# Patient Record
Sex: MALE | Race: WHITE | Employment: FULL TIME | URBAN - METROPOLITAN AREA
[De-identification: names, ages, dates, MRNs, and addresses within clinical notes are randomized per-mention and may not be internally consistent; named-entity substitution may affect disease eponyms.]

---

## 2019-08-05 ENCOUNTER — OFFICE VISIT (OUTPATIENT)
Dept: ORTHOPEDIC SURGERY | Age: 64
End: 2019-08-05
Payer: COMMERCIAL

## 2019-08-05 VITALS — DIASTOLIC BLOOD PRESSURE: 75 MMHG | SYSTOLIC BLOOD PRESSURE: 126 MMHG | HEART RATE: 52 BPM

## 2019-08-05 DIAGNOSIS — S46.112A RUPTURE LONG HEAD BICEPS TENDON, LEFT, INITIAL ENCOUNTER: Primary | ICD-10-CM

## 2019-08-05 DIAGNOSIS — M25.512 LEFT SHOULDER PAIN, UNSPECIFIED CHRONICITY: ICD-10-CM

## 2019-08-05 PROCEDURE — 99204 OFFICE O/P NEW MOD 45 MIN: CPT | Performed by: ORTHOPAEDIC SURGERY

## 2019-08-05 NOTE — PROGRESS NOTES
Date:  2019    Name:  Eric Maradiaga  Address:  [address] [place] [id number]    :  1955      Age:   59 y.o.    SSN:  [id number]      Medical Record Number:  [id number]    Reason for Visit:    Chief Complaint    Arm Pain (np left arm / shoulder. pt states that he was lifting a hot water heater and felt a pop in his shoulder )      DOS:2019     HPI: Eric Maradiaga is a 59 y.o. male here today for evaluation of left arm pain that has been ongoing for 2 and a half weeks. He injured his left bicep at work on 19 while moving a hot water heater. He states he felt a pop and noticed a knot in his bicep. He went to  some tools shortly after and felt a burning in his left arm. He complains of pain and weakness with lifting. He denies numbness and tingling. The pain will wake him up at night if he lays on his left shoulder. He has been treating his pain with ibuprofen. No past medical history on file. No past surgical history on file. No family history on file.     Social History     Socioeconomic History    Marital status:      Spouse name: Not on file    Number of children: Not on file    Years of education: Not on file    Highest education level: Not on file   Occupational History    Not on file   Social Needs    Financial resource strain: Not on file    Food insecurity:     Worry: Not on file     Inability: Not on file    Transportation needs:     Medical: Not on file     Non-medical: Not on file   Tobacco Use    Smoking status: Not on file   Substance and Sexual Activity    Alcohol use: Not on file    Drug use: Not on file    Sexual activity: Not on file   Lifestyle    Physical activity:     Days per week: Not on file     Minutes per session: Not on file    Stress: Not on file   Relationships    Social connections:     Talks on phone: Not on file     Gets together: Not on file     Attends Mu-ism service: Not on file     Active member of club or over the deltoid. Right Shoulder Examination:    Inspection:  Held in a normal posture. Normal contour at the acromioclavicular joint. No abnormal swelling, ecchymosis or erythema about the shoulder. No induration. No atrophy appreciated. Palpation: Tenderness of the greater tuberosity. Acromioclavicular joint: Nontender to palpation. Range of Motion: Normal scapulothoracic rhythm. Mild limitation of internal rotation. Strength: Normal infraspinatus and subscapularis muscle strength. Mild supraspinatus muscle weakness secondary to pain. Instability: No anterior or posterior subluxation/instability. Additional Tests: Crossover sign is negative. Belly press sign is negative. Lift off sign is negative. Labral findings are negative. Speed sign and Yergason signs are both negative. Positive impingement findings. Vascular:       Skin warm well perfused. Neurologic:     Sensation intact to light touch. Diagnostics:  Radiology:     Pertinent imaging reviewed. X-rays of the left shoulder including left Grashey, left scapular Y and left axillary views were obtained and reviewed in office:    Impression: No acute bony abnormalities appreciated on radiographic examination. Mild impingement. Assessment: Rupture long head of the biceps tendon, left    Plan: Pertinent imaging was reviewed. The etiology, natural history, and treatment options for the disorder were discussed. The roles of activity medication, antiinflammatories, injections, bracing, physical therapy, and surgical interventions were all described to the patient and questions were answered. I do not believe surgical intervention is needed. We discussed activity modification and to avoid heavy lifting while healing takes place. He can use ice and Tylenol as needed. I will see him back in one month, sooner if symptoms worsen. Karen Cordero is in agreement with this plan.  All questions were answered to patient's

## 2022-06-27 LAB
A/G RATIO: 1.5 (ref 1–2.1)
ALBUMIN SERPL-MCNC: 4.6 G/DL (ref 3.5–5)
ALP BLD-CCNC: 68 U/L (ref 33–140)
ALT SERPL-CCNC: 27 U/L (ref 0–60)
ANION GAP SERPL CALCULATED.3IONS-SCNC: 10 MMOL/L (ref 5–13)
AST SERPL-CCNC: 31 U/L (ref 0–40)
BILIRUB SERPL-MCNC: 0.4 MG/DL (ref 0.2–1.2)
BUN / CREAT RATIO: 15
BUN BLDV-MCNC: 16 MG/DL (ref 7–25)
CALCIUM SERPL-MCNC: 9.6 MG/DL (ref 8.8–10.9)
CHLORIDE BLD-SCNC: 105 MMOL/L (ref 98–110)
CHOLESTEROL, TOTAL: 208 MG/DL (ref 125–199)
CHOLESTEROL/HDL RATIO: 5.3 (ref 0–5)
CO2: 28 MMOL/L (ref 22–29)
CREAT SERPL-MCNC: 1.05 MG/DL (ref 0.5–1.3)
EGFR (CKD-EPI): 78 SEE NOTE
GLOBULIN: 3.1 G/DL (ref 2–3.7)
GLUCOSE BLD-MCNC: 88 MG/DL (ref 71–99)
HDLC SERPL-MCNC: 39 MG/DL (ref 40–180)
LDL CHOLESTEROL CALCULATED: ABNORMAL
POTASSIUM SERPL-SCNC: 5.5 MMOL/L (ref 3.5–5.1)
SODIUM BLD-SCNC: 143 MMOL/L (ref 135–146)
TOTAL PROTEIN: 7.7 G/DL (ref 6–8)
TRIGL SERPL-MCNC: 477 MG/DL (ref 0–149)

## 2022-06-29 LAB
ANION GAP SERPL CALCULATED.3IONS-SCNC: 11 MMOL/L (ref 5–13)
BUN / CREAT RATIO: 19
BUN BLDV-MCNC: 23 MG/DL (ref 7–25)
CALCIUM SERPL-MCNC: 9.1 MG/DL (ref 8.8–10.9)
CHLORIDE BLD-SCNC: 102 MMOL/L (ref 98–110)
CO2: 24 MMOL/L (ref 22–29)
CREAT SERPL-MCNC: 1.21 MG/DL (ref 0.5–1.3)
EGFR (CKD-EPI): 66 SEE NOTE
GLUCOSE BLD-MCNC: 175 MG/DL (ref 71–99)
POTASSIUM SERPL-SCNC: 4.3 MMOL/L (ref 3.5–5.1)
SODIUM BLD-SCNC: 137 MMOL/L (ref 135–146)

## 2022-06-30 ENCOUNTER — TELEPHONE (OUTPATIENT)
Dept: ORTHOPEDIC SURGERY | Age: 67
End: 2022-06-30

## 2022-06-30 ENCOUNTER — OFFICE VISIT (OUTPATIENT)
Dept: ORTHOPEDIC SURGERY | Age: 67
End: 2022-06-30

## 2022-06-30 VITALS — HEIGHT: 70 IN | WEIGHT: 178 LBS | BODY MASS INDEX: 25.48 KG/M2

## 2022-06-30 DIAGNOSIS — M25.511 RIGHT SHOULDER PAIN, UNSPECIFIED CHRONICITY: Primary | ICD-10-CM

## 2022-06-30 DIAGNOSIS — Z98.890 H/O REPAIR OF RIGHT ROTATOR CUFF: ICD-10-CM

## 2022-06-30 DIAGNOSIS — M67.911 ROTATOR CUFF DISORDER, RIGHT: ICD-10-CM

## 2022-06-30 PROCEDURE — 99214 OFFICE O/P EST MOD 30 MIN: CPT | Performed by: ORTHOPAEDIC SURGERY

## 2022-06-30 PROCEDURE — 4004F PT TOBACCO SCREEN RCVD TLK: CPT | Performed by: ORTHOPAEDIC SURGERY

## 2022-06-30 PROCEDURE — 3017F COLORECTAL CA SCREEN DOC REV: CPT | Performed by: ORTHOPAEDIC SURGERY

## 2022-06-30 PROCEDURE — G8427 DOCREV CUR MEDS BY ELIG CLIN: HCPCS | Performed by: ORTHOPAEDIC SURGERY

## 2022-06-30 PROCEDURE — G8417 CALC BMI ABV UP PARAM F/U: HCPCS | Performed by: ORTHOPAEDIC SURGERY

## 2022-06-30 PROCEDURE — 1123F ACP DISCUSS/DSCN MKR DOCD: CPT | Performed by: ORTHOPAEDIC SURGERY

## 2022-06-30 RX ORDER — CYCLOBENZAPRINE HCL 10 MG
10 TABLET ORAL 3 TIMES DAILY PRN
COMMUNITY
Start: 2022-06-27

## 2022-06-30 RX ORDER — MELOXICAM 15 MG/1
15 TABLET ORAL DAILY PRN
Qty: 30 TABLET | Refills: 0 | Status: ON HOLD | OUTPATIENT
Start: 2022-06-30 | End: 2022-08-12 | Stop reason: HOSPADM

## 2022-06-30 RX ORDER — PREDNISONE 10 MG/1
TABLET ORAL
COMMUNITY
Start: 2022-06-27 | End: 2022-07-06

## 2022-06-30 RX ORDER — ROSUVASTATIN CALCIUM 20 MG/1
20 TABLET, COATED ORAL DAILY
COMMUNITY
Start: 2022-06-27

## 2022-06-30 NOTE — PROGRESS NOTES
Chief Complaint    Shoulder Pain (NP RT shoulder)      History of Present Illness:  Jeremie Hensley is a pleasant,  79 y.o. male here today for evaluation of right shoulder. He previously underwent right shoulder arthroscopic repair about 9 years ago by Dr. Ada Carbajal. He was doing relatively well till about 5 days ago, he was cutting grass and landed on his right shoulder. After that his pain is getting worse. He describes his pain as dull aching in quality , about 5/10 in severity and waking him up from sleep. He denies popping, clicking or any other mechanical symptoms. He denies numbness, tingling or any other neurological symptoms. Medical History:    No notes on file    Patient's medications, allergies, past medical, surgical, social and family histories were reviewed and updated as appropriate. No past medical history on file. Social History     Socioeconomic History    Marital status:      Spouse name: Not on file    Number of children: Not on file    Years of education: Not on file    Highest education level: Not on file   Occupational History    Not on file   Tobacco Use    Smoking status: Not on file    Smokeless tobacco: Not on file   Substance and Sexual Activity    Alcohol use: Not on file    Drug use: Not on file    Sexual activity: Not on file   Other Topics Concern    Not on file   Social History Narrative    Not on file     Social Determinants of Health     Financial Resource Strain:     Difficulty of Paying Living Expenses: Not on file   Food Insecurity:     Worried About Running Out of Food in the Last Year: Not on file    Armin of Food in the Last Year: Not on file   Transportation Needs:     Lack of Transportation (Medical): Not on file    Lack of Transportation (Non-Medical):  Not on file   Physical Activity:     Days of Exercise per Week: Not on file    Minutes of Exercise per Session: Not on file   Stress:     Feeling of Stress : Not on file   Social Connections:  Frequency of Communication with Friends and Family: Not on file    Frequency of Social Gatherings with Friends and Family: Not on file    Attends Confucianist Services: Not on file    Active Member of Clubs or Organizations: Not on file    Attends Club or Organization Meetings: Not on file    Marital Status: Not on file   Intimate Partner Violence:     Fear of Current or Ex-Partner: Not on file    Emotionally Abused: Not on file    Physically Abused: Not on file    Sexually Abused: Not on file   Housing Stability:     Unable to Pay for Housing in the Last Year: Not on file    Number of [place] in the Last Year: Not on file    Unstable Housing in the Last Year: Not on file       No Known Allergies  Current Outpatient Medications on File Prior to Visit   Medication Sig Dispense Refill    cyclobenzaprine (FLEXERIL) 10 MG tablet Take 10 mg by mouth 3 times daily as needed      predniSONE (DELTASONE) 10 MG tablet Take by mouth      rosuvastatin (CRESTOR) 20 MG tablet Take 20 mg by mouth daily       No current facility-administered medications on file prior to visit. Review of Systems  A 14 point review of systems was completed by the patient on 6/30/2022 and is available in the media section of the scanned medical record and was reviewed on 6/30/2022. The review is negative with the exception of those things mentioned in the HPI and Past Medical History    Vital Signs: There were no vitals filed for this visit. General Exam:   Constitutional: Patient is adequately groomed with no evidence of malnutrition      Right Shoulder Examination:    Inspection:  No skin lesions, no deformity, no swelling. Palpation:  Pain over rotator cuff. Biceps not tender. Slight tenderness over the [facility] joint    Active Range of Motion:   Forward Elevation 150, Abduction 150, External Rotation 30, Internal Rotation T12    Passive Range of Motion: Deffered    Strength:  External Rotation 4/5, Internal Rotation 4/5, Champagne Toast 4/5, Supraspinatus 4/5    Special Tests:  positive Eloina's. No Tomi deformity. Negative belly press. Negative bear hug  Test     Neurovascular: Palpable radial pulse, normal sensation in the median, ulnar, radial nerve distributions        Comparison left  Shoulder Examination:    Inspection:  No skin lesions, no deformity, no swelling. Palpation:   No tenderness     Active Range of Motion: Forward Elevation 150, Abduction 150, External Rotation 40, Internal Rotation T6    Passive Range of Motion: Deffered    Strength:  5/5    Special Tests:  No Tomi Deformity    Neurovascular:  Palpable radial pulse, normal sensation in the median, ulnar, radial nerve distributions      Self assessment questionnaires were completed today. Radiology:     Plain radiographs of the right shoulder, comprising 3 views: AP, Scapular Y and Axillary lateral were  obtained and reviewed in the office:    Impression: no evidence of fracture or dislocation   There is slight migration of the humeral head superiorly   Mild glenohumeral osteoarthritis          Assessment :  Marcelino Nice is a pleasant 79 y.o. male with pain related to right shoulder rotator cuff retear after falling down 5 days ago. The next step is to order an MRI to evaluate for a rotator cuff re-tear. Impression:  Encounter Diagnoses   Name Primary?  Right shoulder pain, unspecified chronicity Yes    H/O repair of right rotator cuff     Rotator cuff disorder, right        Office Procedures:  Orders Placed This Encounter   Procedures    MRI SHOULDER RIGHT WO CONTRAST     Standing Status:   Future     Standing Expiration Date:   6/30/2023     Order Specific Question:   Reason for exam:     Answer:   Bryan Greene       Treatment Plan: We had a thorough discussion with Marcelino Nice regarding examination findings and next steps. He previously underwent right shoulder rotator cuff repair nearly 10 years ago.  He was doing well until a recent injury. Unfortunately has fell down few days ago on his right shoulder. After that he has experienced right shoulder pain with any overhead activities. His clinical examination shows weak rotator cuff muscles. Would like to have an MRI of his right shoulder to rule out rotator cuff retear. We would like to see him after the MRI. He was in agreement with this plan and all questions were answered to the patient's satisfaction. He was encouraged to call with any questions. 11:56 AM  6/30/2022    Stephen Maldonado MD  Clinical Fellow at [address] [place] [address]    During this examination, Brenda Cespedes MD functioned as a scribe for Dr. Syl Le. This dictation was performed with a verbal recognition program (DRAGON) and it was checked for errors. It is possible that there are still dictated errors within this office note. If so, please bring any errors to my attention for an addendum. All efforts were made to ensure that this office note is accurate.  ______________  I was physically present and personally supervised the Orthopaedic Sports Medicine Fellow in the evaluation and development of a treatment plan for this patient. I personally interviewed the patient and performed a physical examination. In addition, I discussed the patient's condition and treatment options with them. I have also reviewed and agree with the past medical, family and social history unless otherwise noted. All of the patient's questions were answered. Hemant Philip MD, PhD  6/30/2022

## 2022-07-05 ENCOUNTER — OFFICE VISIT (OUTPATIENT)
Dept: ORTHOPEDIC SURGERY | Age: 67
End: 2022-07-05
Payer: MEDICARE

## 2022-07-05 ENCOUNTER — TELEPHONE (OUTPATIENT)
Dept: ORTHOPEDIC SURGERY | Age: 67
End: 2022-07-05

## 2022-07-05 VITALS — BODY MASS INDEX: 25.48 KG/M2 | HEIGHT: 70 IN | WEIGHT: 178 LBS

## 2022-07-05 DIAGNOSIS — S46.011D TRAUMATIC COMPLETE TEAR OF RIGHT ROTATOR CUFF, SUBSEQUENT ENCOUNTER: ICD-10-CM

## 2022-07-05 DIAGNOSIS — Z98.890 H/O REPAIR OF RIGHT ROTATOR CUFF: Primary | ICD-10-CM

## 2022-07-05 DIAGNOSIS — M25.511 RIGHT SHOULDER PAIN, UNSPECIFIED CHRONICITY: ICD-10-CM

## 2022-07-05 PROBLEM — E78.00 HYPERCHOLESTEREMIA: Status: ACTIVE | Noted: 2020-12-22

## 2022-07-05 PROCEDURE — G8417 CALC BMI ABV UP PARAM F/U: HCPCS | Performed by: ORTHOPAEDIC SURGERY

## 2022-07-05 PROCEDURE — 3017F COLORECTAL CA SCREEN DOC REV: CPT | Performed by: ORTHOPAEDIC SURGERY

## 2022-07-05 PROCEDURE — 4004F PT TOBACCO SCREEN RCVD TLK: CPT | Performed by: ORTHOPAEDIC SURGERY

## 2022-07-05 PROCEDURE — 1123F ACP DISCUSS/DSCN MKR DOCD: CPT | Performed by: ORTHOPAEDIC SURGERY

## 2022-07-05 PROCEDURE — 99214 OFFICE O/P EST MOD 30 MIN: CPT | Performed by: ORTHOPAEDIC SURGERY

## 2022-07-05 PROCEDURE — G8428 CUR MEDS NOT DOCUMENT: HCPCS | Performed by: ORTHOPAEDIC SURGERY

## 2022-07-05 PROCEDURE — L3670 SO ACRO/CLAV CAN WEB PRE OTS: HCPCS | Performed by: ORTHOPAEDIC SURGERY

## 2022-07-05 NOTE — TELEPHONE ENCOUNTER
Surgery and/or Procedure Scheduling     Contact Name: Milton Ayala Request: RT [facility]  Patient Contact Number: [phone]    PATIENT IS REQ A RETURN CALL TO SCHEDULE SURGERY. PLEASE CONTACT AT ABOVE NUMBER.

## 2022-07-05 NOTE — PROGRESS NOTES
Chief Complaint    Follow-up (Right Shoulder - MRI F/u)      History of Present Illness:  Ramos Corona is a pleasant, 79 y.o., male, here today for follow up of his right shoulder. To recap, this patient was cutting his grass nearly 2 weeks ago when he slipped and fell on his right shoulder resulting in pain and weakness. His history and physical exam were concerning for a significant cuff tear. We ordered an MRI to evaluate this. He tolerated the study well and presents today to review those results. Since the injury his shoulder has slightly improved in terms of both motion and pain. He reports no new injuries or setbacks. Pain Assessment  Location of Pain: Shoulder  Location Modifiers: Right  Severity of Pain: 1  Quality of Pain: Sharp  Duration of Pain: Persistent  Frequency of Pain: Intermittent  Aggravating Factors:  (only certain movements and sleeping)  Limiting Behavior: Yes  Relieving Factors: Rest,Nsaids  Work-Related Injury: No  Are there other pain locations you wish to document?: No      Medical History:  Patient's medications, allergies, past medical, surgical, social and family histories were reviewed and updated as appropriate. Review of Systems  A 14 point review of systems was completed by the patient on 6/30/22 and is available in the media section of the scanned medical record and was reviewed on 7/5/2022. The review is negative with the exception of those things mentioned in the HPI and Past Medical History    Vital Signs:  Vitals:       General/Appearance: Alert and oriented and in no apparent distress. Skin:  There are no skin lesions, cellulitis, or extreme edema. The patient has warm and well-perfused Bilateral upper extremities with brisk capillary refill. Right Shoulder Exam:  Inspection: No gross deformities, no signs of infection. Palpation: Tenderness over the rotator cuff footprint    Active Range of Motion:   Forward Elevation 150, External Rotation 30, Internal Rotation T12    Passive Range of Motion: Deferred    Strength:  External Rotation 4/5, Internal Rotation 4/5, Supraspinatus 4-/5    Special Tests: No Tomi muscle deformity. Neurovascular: Sensation to light touch is intact, no motor deficits, palpable radial pulses 2+      Radiology:     No new XR obtained at this time. MRI Right Shoulder 7/3/22     CONCLUSION:   1. Complete retracted recurrent tear supraspinatus tendon tendon. The tendon torn from the    reparative site distally. Small remnant attached to the footplate. Moderate complex bursitis. Capsulitis. Mild muscle fatty atrophy. 2. Moderately tendinopathic, hypertrophic and frayed infraspinatus insertion. Ossified body    within the tendon interstitium. Mild muscle fatty atrophy. 3. Glenohumeral arthropathy. Chronically torn and worn anterior labrum. Assessment :  Mr. Ramakrishna Aguilar is a pleasant, 79 y.o. patient with a complete, retracted, recurrent supraspinatus tear of the right shoulder as demonstrated on MRI. He has several options moving forward. Impression:  Encounter Diagnoses   Name Primary?  H/O repair of right rotator cuff Yes    Right shoulder pain, unspecified chronicity     Traumatic complete tear of right rotator cuff, subsequent encounter        Office Procedures:  Orders Placed This Encounter   Procedures    DJO ultrasling IV Shoulder Sling     Patient was prescribed a DJO Ultrasling IV Shoulder Brace. The right shoulder will require stabilization / immobilization from this orthosis. The orthosis will assist in protecting the affected area, provide functional support and facilitate healing. The device was ordered and fit on 7/5/2022. The patient was educated and fit by a healthcare professional with expert knowledge and specialization in brace application while under the direct supervision of the treating physician.   Verbal and written instructions for the use of and application of this item were provided. They were instructed to contact the office immediately should the brace result in increased pain, decreased sensation, increased swelling or worsening of the condition. Treatment Plan: We reviewed pertinent imaging today with the patient. We discussed diagnosis and treatment options in detail. We discussed conservative vs surgical treatment options moving forward. Conservatively we can treat the symptoms and work in physical therapy to regain motion. Surgically we recommend an arthroscopic rotator cuff repair. We discussed in detail risks, benefits and expectations postoperatively. We also discussed a recovery timeline following this operation. If he decides to move forward with surgical treatment we would recommend this be done in a timely manner as putting off an operation risks atrophy and retraction of the tendon which may, in turn, become irreparable. After thoroughly discussing both options, he would like to plan for an operation. He has a family vacation coming up at the end of this month and would like to schedule surgery after that. He was advised to schedule surgery as soon as he returns as putting off an operation will make his outcome less predictable. He is in understanding of this. We will see Clint Mcclure back for surgery and/or as needed. All questions were answered to patient's satisfaction and He was encouraged to call with any further questions or concerns. Matthias Sweet is in agreement with this plan. Risks, benefits and potential complications of arthroscopic shoulder surgery were discussed with the patient. Risks discussed include but are not limited to bleeding, infection, anesthetic risk, injury to nerves and blood vessels, deep vein thrombosis, residual stiffness and weakness, and the need for revision surgery. The patient also understands that anesthetic risks include cardiopulmonary issues, drug reactions and even death.  The patient voices an understanding of the importance of physical therapy and home exercises after surgery. All questions were answered and written informed consent for surgery was obtained today. The patient was counseled at length about the risks of sincere Covid-19 during their perioperative period and any recovery window from their procedure. The patient was made aware that sincere Covid-19  may worsen their prognosis for recovering from their procedure  and lend to a higher morbidity and/or mortality risk. All material risks, benefits, and reasonable alternatives including postponing the procedure were discussed. The patient does wish to proceed with the procedure at this time. 7/5/2022  11:35 AM    Isabell Stinson ATC  [employer] [address]    During this examination, I, Yobany Chatterjee, functioned as a scribe for Dr. Bernabe Cottrell. The history taking and physical examination were performed by Dr. Kalpana Goodman. All counseling during the appointment was performed between the patient and Dr. Kalpana Goodman. 7/5/22    ______________  I, Dr. Bernabe Cottrell, personally performed the services described in this documentation as described by Isabell Stinson ATC in my presence, and it is both accurate and complete. Hemant Goodman MD, PhD  7/5/2022

## 2022-07-18 ENCOUNTER — TELEPHONE (OUTPATIENT)
Dept: ORTHOPEDIC SURGERY | Age: 67
End: 2022-07-18

## 2022-07-18 NOTE — TELEPHONE ENCOUNTER
I SPOKE WITH CALEB AND ADVISED HIM TO MAKE HIS APPT AND IF FOR SOME UNFORSEEN REASON THE PACKET DOESN'T MAKE IT IN TIME WA CAN FAX IT TO HIS PCP

## 2022-07-18 NOTE — TELEPHONE ENCOUNTER
General Question     Subject: PHYSICAL FOR SURGERY   Patient and /or Facility Request: Larissa Mckeon  Contact Number: [phone]    PATIENT CALLING ABOUT PAPERWORK FOR AN PHYSICAL TO BE COMPLETED. PATIENT WILL BE HAVING SURGERY IN [place]. Santhosh Mak PATIENT STATES THAT A PACKAGE WAS SUPPOSE TO ARRIVE TO HIS HOUSE WITH THE PAPERWORK TO TAKE TO THE OFFICE. Santhosh Mak HE WOULD LIKE TO KNOW WHEN THE PAPERWORK WILL ARRIVE BEFORE MAKING THIS APPT. Santhosh Mak PLEASE CALL PATIENT BACK THE  ABOVE NUMBER. Santhosh Mak

## 2022-07-22 ENCOUNTER — TELEPHONE (OUTPATIENT)
Dept: ORTHOPEDIC SURGERY | Age: 67
End: 2022-07-22

## 2022-07-22 NOTE — TELEPHONE ENCOUNTER
Auth: [employer]  Date: 08/12//22  Reference # None  Spoke with: None  Type of SX: Outpatient  Location: [facility]  CPT: 74343   DX: [phone], [phone]  SX area: Rt shoulder  Insurance: Medicare A&B

## 2022-07-26 ENCOUNTER — TELEPHONE (OUTPATIENT)
Dept: ORTHOPEDIC SURGERY | Age: 67
End: 2022-07-26

## 2022-07-26 NOTE — TELEPHONE ENCOUNTER
Surgery and/or Procedure Scheduling     Contact Name: Ngo Shira Request: SHOULDER   Patient Contact Number: [phone]    PATIENT WIFE CALLING TO SEE WHERE THEY ARE GOING FOR SURGERY. AND WHAT TIME THEY MATT TO ARRIVE TO [facility] [address] Su Cunningham PLEASE CALL PATIENT BACK AT THE ABOVE NUMBER. Su Cunningham

## 2022-08-08 RX ORDER — LORATADINE 10 MG/1
10 TABLET ORAL DAILY
COMMUNITY

## 2022-08-08 NOTE — PROGRESS NOTES
Place patient label inside box (if no patient label, complete below)  Name:  :  MR#:   Nas Steiner / PROCEDURE  I (we), Arvind Collado authorize DR Milas Apley and/or such assistants as may be selected by him/her, to perform the following operation/procedure(s): RIGHT SHOULDER ARTHROSCOPIC ROTATOR CUFF REPAIR        Note: If unable to obtain consent prior to an emergent procedure, document the emergent reason in the medical record. This procedure has been explained to my (our) satisfaction and included in the explanation was: The intended benefit, nature, and extent of the procedure to be performed; The significant risks involved and the probability of success; Alternative procedures and methods of treatment; The dangers and probable consequences of such alternatives (including no procedure or treatment); The expected consequences of the procedure on my future health; Whether other qualified individuals would be performing important surgical tasks and/or whether  would be present to advise or support the procedure. I (we) understand that there are other risks of infection and other serious complications in the pre-operative/procedural and postoperative/procedural stages of my (our) care. I (we) have asked all of the questions which I (we) thought were important in deciding whether or not to undergo treatment or diagnosis. These questions have been answered to my (our) satisfaction. I (we) understand that no assurance can be given that the procedure will be a success, and no guarantee or warranty of success has been given to me (us). It has been explained to me (us) that during the course of the operation/procedure, unforeseen conditions may be revealed that necessitate extension of the original procedure(s) or different procedure(s) than those set forth in Paragraph 1.  I (we) authorize and request that the above-named physician, his/her assistants or his/her designees, perform procedures as necessary and desirable if deemed to be in my (our) best interest.     Revised 8/2/2021                                                                          Page 1 of 2           I acknowledge that health care personnel may be observing this procedure for the purpose of medical education or other specified purposes as may be necessary as requested and/or approved by my (our) physician. I (we) consent to the disposal by the hospital Pathologist of the removed tissue, parts or organs in accordance with hospital policy. I do ____ do not ____ consent to the use of a local infiltration pain blocking agent that will be used by my provider/surgical provider to help alleviate pain during my procedure. I do ____ do not ____ consent to an emergent blood transfusion in the case of a life-threatening situation that requires blood components to be administered. This consent is valid for 24 hours from the beginning of the procedure. This patient does ____ or does not ____ currently have a DNR status/order. If DNR order is in place, obtain Addendum to the Surgical Consent for ALL Patients with a DNR Order to address lora-operative status for limited intervention or DNR suspension.      I have read and fully understand the above Consent for Operation/Procedure and that all blanks were completed before I signed the consent.   _____________________________       _____________________      ____/____am/pm  Signature of Patient or legal representative      Printed Name / Relationship            Date / Time   ____________________________       _____________________      ____/____am/pm  Witness to Signature                                    Printed Name                    Date / Time    If patient is unable to sign or is a minor, complete the following)  Patient is a minor, ____ years of age, or unable to sign because: ______________________________________________________________________________________________    If a phone consent is obtained, consent will be documented by using two health care professionals, each affirming that the consenting party has no questions and gives consent for the procedure discussed with the physician/provider.   _____________________          ____________________       _____/_____am/pm   2nd witness to phone consent        Printed name           Date / Time    Informed Consent:  I have provided the explanation described above in section 1 to the patient and/or legal representative.  I have provided the patient and/or legal representative with an opportunity to ask any questions about the proposed operation/procedure.   ___________________________          ____________________         ____/____am/pm  Provider / Proceduralist                            Printed name            Date / Time  Revised 8/2/2021                                                                      Page 2 of 2

## 2022-08-08 NOTE — PROGRESS NOTES
[facility] PRE-SURGICAL TESTING INSTRUCTIONS                      PRIOR TO PROCEDURE DATE:    1. PLEASE FOLLOW ANY INSTRUCTIONS GIVEN TO YOU PER YOUR SURGEON. 2. Arrange for someone to drive you home and be with you for the first 24 hours after discharge for your safety after your procedure for which you received sedation. Ensure it is someone we can share information with regarding your discharge. NOTE: At this time ONLY 2 ADULTS may accompany you & everyone must be MASKED. 3. You must contact your surgeon for instructions IF:  You are taking any blood thinners, aspirin, anti-inflammatory or vitamins. There is a change in your physical condition such as a cold, fever, rash, cuts, sores, or any other infection, especially near your surgical site. 4. Do not drink alcohol the day before or day of your procedure. Do not use any recreational marijuana at least 24 hours or street drugs (heroin, cocaine) at minimum 5 days prior to your procedure. 5. A Pre-Surgical History and Physical MUST be completed WITHIN 30 DAYS OR LESS prior to your procedure. by your Physician or an Urgent Care        THE DAY OF YOUR PROCEDURE:  1. Follow instructions for ARRIVAL TIME as DIRECTED BY YOUR SURGEON. 2. Enter the MAIN entrance from [address] and follow the signs to the free Parking Garage or [employer] (offered free of charge 7 am-5pm). 3. Enter the Main Entrance of the hospital (do not enter from the lower level of the parking garage). Upon entrance, check in with the  at the surgical information desk on your LEFT. Bring your insurance card and photo ID to register      4. DO NOT EAT ANYTHING 8 hours prior to arrival for surgery. You may have up to 8 ounces of water 4 hours prior to your arrival for surgery.    NOTE: ALL Gastric, Bariatric & Bowel surgery patients - you MUST follow your surgeon's instructions regarding eating/ drinking as you will have very specific instructions to follow. If you did not receive these, call your surgeon's office immediately. 5. MEDICATIONS:  Take the following medications with a SMALL sip of water:   Use your usual dose of inhalers the morning of surgery. BRING your rescue inhaler with you to hospital.   Anesthesia does NOT want you to take insulin the morning of surgery. They will control your blood sugar while you are at the hospital. Please contact your ordering physician for instructions regarding your insulin the night before your procedure. If you have an insulin pump, please keep it set on basal rate. Bariatric patient's call your surgeon if on diabetic medications as some may need to be stopped 1 week prior to surgery    6. Do not swallow additional water when brushing teeth. No gum, candy, mints, or ice chips. Refrain from smoking or at least decrease the amount on day of surgery. 7. Morning of surgery:   Take a shower with an antibacterial soap (i.e., Safeguard or Dial) OR your physician may have instructed you to use Hibiclens. Dress in loose, comfortable clothing appropriate for redressing after your procedure. Do not wear jewelry (including body piercings), make-up (especially NO eye make-up), fingernail polish (NO toenail polish if foot/leg surgery), lotion, powders, or metal hairclips. Do not shave or wax for 72 hours prior to procedure near your operative site. Shaving with a razor can irritate your skin and make it easier to develop an infection. On the day of your procedure, any hair that needs to be removed near the surgical site will be 'clipped' by a healthcare worker using a special clipper designed to avoid skin irritation. 8. Dentures, glasses, or contacts will need to be removed before your procedure. Bring cases for your glasses, contacts, dentures, or hearing aids to protect them while you are in surgery. 9. If you use a CPAP, please bring it with you on the day of your procedure.     10. We recommend that valuable personal belongings such as cash, cell phones, e-tablets, or jewelry, be left at home during your stay. The hospital will not be responsible for valuables that are not secured in the hospital safe. However, if your insurance requires a co-pay, you may want to bring a method of payment, i.e., Check or credit card, if you wish to pay your co-pay the day of surgery. 11. If you are to stay overnight, you may bring a bag with personal items. Please have any large items you may need brought in by your family after your arrival to your hospital room. 12. If you have a Living Will or Durable Power of , please bring a copy on the day of your procedure. How we keep you safe and work to prevent surgical site infections:   1. Health care workers should always check your ID bracelet to verify your name and birth date. You will be asked many times to state your name, date of birth, and allergies. 2. Health care workers should always clean their hands with soap or alcohol gel before providing care to you. It is okay to ask anyone if they cleaned their hands before they touch you. 3. You will be actively involved in verifying the type of procedure you are having and ensuring the correct surgical site. This will be confirmed multiple times prior to your procedure. Do NOT kate your surgery site UNLESS instructed to by your surgeon. 4. When you are in the operating room, your surgical site will be cleansed with a special soap, and in most cases, you will be given an antibiotic before the surgery begins. What to expect AFTER your procedure? 1. Immediately following your procedure, your will be taken to the PACU for the first phase of your recovery. Your nurse will help you recover from any potential side effects of anesthesia, such as extreme drowsiness, changes in your vital signs or breathing patterns.  Nausea, headache, muscle aches, or sore throat may also occur after

## 2022-08-12 ENCOUNTER — HOSPITAL ENCOUNTER (OUTPATIENT)
Age: 67
Setting detail: OUTPATIENT SURGERY
Discharge: HOME | End: 2022-08-12
Attending: ORTHOPAEDIC SURGERY | Admitting: ORTHOPAEDIC SURGERY
Payer: MEDICARE

## 2022-08-12 ENCOUNTER — ANESTHESIA (OUTPATIENT)
Dept: OPERATING ROOM | Age: 67
End: 2022-08-12
Payer: MEDICARE

## 2022-08-12 ENCOUNTER — ANESTHESIA EVENT (OUTPATIENT)
Dept: OPERATING ROOM | Age: 67
End: 2022-08-12
Payer: MEDICARE

## 2022-08-12 VITALS
BODY MASS INDEX: 26.14 KG/M2 | TEMPERATURE: 97 F | WEIGHT: 182.6 LBS | DIASTOLIC BLOOD PRESSURE: 84 MMHG | SYSTOLIC BLOOD PRESSURE: 120 MMHG | HEART RATE: 58 BPM | OXYGEN SATURATION: 95 % | RESPIRATION RATE: 20 BRPM | HEIGHT: 70 IN

## 2022-08-12 DIAGNOSIS — E78.00 HYPERCHOLESTEREMIA: Primary | ICD-10-CM

## 2022-08-12 DIAGNOSIS — Z98.890 S/P SHOULDER SURGERY: ICD-10-CM

## 2022-08-12 PROCEDURE — 7100000001 HC PACU RECOVERY - ADDTL 15 MIN: Performed by: ORTHOPAEDIC SURGERY

## 2022-08-12 PROCEDURE — 6360000002 HC RX W HCPCS: Performed by: ORTHOPAEDIC SURGERY

## 2022-08-12 PROCEDURE — 3700000001 HC ADD 15 MINUTES (ANESTHESIA): Performed by: ORTHOPAEDIC SURGERY

## 2022-08-12 PROCEDURE — 3700000000 HC ANESTHESIA ATTENDED CARE: Performed by: ORTHOPAEDIC SURGERY

## 2022-08-12 PROCEDURE — 7100000011 HC PHASE II RECOVERY - ADDTL 15 MIN: Performed by: ORTHOPAEDIC SURGERY

## 2022-08-12 PROCEDURE — 2580000003 HC RX 258: Performed by: ORTHOPAEDIC SURGERY

## 2022-08-12 PROCEDURE — 2709999900 HC NON-CHARGEABLE SUPPLY: Performed by: ORTHOPAEDIC SURGERY

## 2022-08-12 PROCEDURE — 7100000010 HC PHASE II RECOVERY - FIRST 15 MIN: Performed by: ORTHOPAEDIC SURGERY

## 2022-08-12 PROCEDURE — 2720000010 HC SURG SUPPLY STERILE: Performed by: ORTHOPAEDIC SURGERY

## 2022-08-12 PROCEDURE — 7100000000 HC PACU RECOVERY - FIRST 15 MIN: Performed by: ORTHOPAEDIC SURGERY

## 2022-08-12 PROCEDURE — 6360000002 HC RX W HCPCS

## 2022-08-12 PROCEDURE — 2500000003 HC RX 250 WO HCPCS: Performed by: ANESTHESIOLOGY

## 2022-08-12 PROCEDURE — 3600000014 HC SURGERY LEVEL 4 ADDTL 15MIN: Performed by: ORTHOPAEDIC SURGERY

## 2022-08-12 PROCEDURE — 3600000004 HC SURGERY LEVEL 4 BASE: Performed by: ORTHOPAEDIC SURGERY

## 2022-08-12 PROCEDURE — 2580000003 HC RX 258: Performed by: ANESTHESIOLOGY

## 2022-08-12 PROCEDURE — 6360000002 HC RX W HCPCS: Performed by: ANESTHESIOLOGY

## 2022-08-12 PROCEDURE — C1713 ANCHOR/SCREW BN/BN,TIS/BN: HCPCS | Performed by: ORTHOPAEDIC SURGERY

## 2022-08-12 PROCEDURE — C9290 INJ, BUPIVACAINE LIPOSOME: HCPCS | Performed by: ANESTHESIOLOGY

## 2022-08-12 PROCEDURE — 2500000003 HC RX 250 WO HCPCS

## 2022-08-12 PROCEDURE — 64415 NJX AA&/STRD BRCH PLXS IMG: CPT | Performed by: ANESTHESIOLOGY

## 2022-08-12 PROCEDURE — C1763 CONN TISS, NON-HUMAN: HCPCS | Performed by: ORTHOPAEDIC SURGERY

## 2022-08-12 DEVICE — BONE ANCHORS 3 WITH ARTHROSCOPIC DELIVERY SYSTEM ADVANCED
Type: IMPLANTABLE DEVICE | Site: SHOULDER | Status: FUNCTIONAL
Brand: BONE ANCHORS WITH ARTHROSCOPIC DELIVERY SYSTEM - ADVANCED

## 2022-08-12 DEVICE — ANCHOR SUT L14.7MM DIA5.5MM BIOCOMPOSITE W/ 3 SZ 2: Type: IMPLANTABLE DEVICE | Site: SHOULDER | Status: FUNCTIONAL

## 2022-08-12 DEVICE — ANCHOR TEND 8 FOR REGENETEN BIOINDUCTIVE IMPL SYS: Type: IMPLANTABLE DEVICE | Site: SHOULDER | Status: FUNCTIONAL

## 2022-08-12 DEVICE — IMPLANTABLE DEVICE
Type: IMPLANTABLE DEVICE | Site: SHOULDER | Status: FUNCTIONAL
Brand: BIOINDUCTIVE IMPLANT WITH ARTHROSCOPIC DELIVERY SYSTEM - LARGE

## 2022-08-12 RX ORDER — DIPHENHYDRAMINE HYDROCHLORIDE 50 MG/ML
12.5 INJECTION INTRAMUSCULAR; INTRAVENOUS
Status: DISCONTINUED | OUTPATIENT
Start: 2022-08-12 | End: 2022-08-12 | Stop reason: HOSPADM

## 2022-08-12 RX ORDER — HYDRALAZINE HYDROCHLORIDE 20 MG/ML
10 INJECTION INTRAMUSCULAR; INTRAVENOUS
Status: DISCONTINUED | OUTPATIENT
Start: 2022-08-12 | End: 2022-08-12 | Stop reason: HOSPADM

## 2022-08-12 RX ORDER — DOXYCYCLINE HYCLATE 100 MG
100 TABLET ORAL 2 TIMES DAILY
Qty: 10 TABLET | Refills: 0 | Status: SHIPPED | OUTPATIENT
Start: 2022-08-12 | End: 2022-08-17

## 2022-08-12 RX ORDER — SODIUM CHLORIDE 0.9 % (FLUSH) 0.9 %
5-40 SYRINGE (ML) INJECTION EVERY 12 HOURS SCHEDULED
Status: DISCONTINUED | OUTPATIENT
Start: 2022-08-12 | End: 2022-08-12 | Stop reason: HOSPADM

## 2022-08-12 RX ORDER — ONDANSETRON 2 MG/ML
INJECTION INTRAMUSCULAR; INTRAVENOUS PRN
Status: DISCONTINUED | OUTPATIENT
Start: 2022-08-12 | End: 2022-08-12 | Stop reason: SDUPTHER

## 2022-08-12 RX ORDER — BUPIVACAINE HYDROCHLORIDE 5 MG/ML
30 INJECTION, SOLUTION EPIDURAL; INTRACAUDAL ONCE
Status: DISCONTINUED | OUTPATIENT
Start: 2022-08-12 | End: 2022-08-12 | Stop reason: HOSPADM

## 2022-08-12 RX ORDER — LABETALOL HYDROCHLORIDE 5 MG/ML
10 INJECTION, SOLUTION INTRAVENOUS
Status: DISCONTINUED | OUTPATIENT
Start: 2022-08-12 | End: 2022-08-12 | Stop reason: HOSPADM

## 2022-08-12 RX ORDER — M-VIT,TX,IRON,MINS/CALC/FOLIC 27MG-0.4MG
1 TABLET ORAL DAILY
COMMUNITY

## 2022-08-12 RX ORDER — SODIUM CHLORIDE 0.9 % (FLUSH) 0.9 %
5-40 SYRINGE (ML) INJECTION PRN
Status: DISCONTINUED | OUTPATIENT
Start: 2022-08-12 | End: 2022-08-12 | Stop reason: HOSPADM

## 2022-08-12 RX ORDER — GLYCOPYRROLATE 0.2 MG/ML
INJECTION INTRAMUSCULAR; INTRAVENOUS PRN
Status: DISCONTINUED | OUTPATIENT
Start: 2022-08-12 | End: 2022-08-12 | Stop reason: SDUPTHER

## 2022-08-12 RX ORDER — SENNOSIDES 8.6 MG
1 TABLET ORAL DAILY
Qty: 30 TABLET | Refills: 1 | Status: SHIPPED | OUTPATIENT
Start: 2022-08-12

## 2022-08-12 RX ORDER — KETAMINE HCL IN NACL, ISO-OSM 20 MG/2 ML
SYRINGE (ML) INJECTION PRN
Status: DISCONTINUED | OUTPATIENT
Start: 2022-08-12 | End: 2022-08-12 | Stop reason: SDUPTHER

## 2022-08-12 RX ORDER — ASPIRIN 81 MG/1
81 TABLET ORAL DAILY
Status: ON HOLD | COMMUNITY
End: 2022-08-12 | Stop reason: HOSPADM

## 2022-08-12 RX ORDER — SODIUM CHLORIDE 9 MG/ML
25 INJECTION, SOLUTION INTRAVENOUS PRN
Status: DISCONTINUED | OUTPATIENT
Start: 2022-08-12 | End: 2022-08-12 | Stop reason: HOSPADM

## 2022-08-12 RX ORDER — METOCLOPRAMIDE HYDROCHLORIDE 5 MG/ML
10 INJECTION INTRAMUSCULAR; INTRAVENOUS
Status: DISCONTINUED | OUTPATIENT
Start: 2022-08-12 | End: 2022-08-12 | Stop reason: HOSPADM

## 2022-08-12 RX ORDER — EPHEDRINE SULFATE 50 MG/ML
INJECTION INTRAVENOUS PRN
Status: DISCONTINUED | OUTPATIENT
Start: 2022-08-12 | End: 2022-08-12 | Stop reason: SDUPTHER

## 2022-08-12 RX ORDER — MIDAZOLAM HYDROCHLORIDE 1 MG/ML
2 INJECTION INTRAMUSCULAR; INTRAVENOUS ONCE
Status: COMPLETED | OUTPATIENT
Start: 2022-08-12 | End: 2022-08-12

## 2022-08-12 RX ORDER — ROCURONIUM BROMIDE 10 MG/ML
INJECTION, SOLUTION INTRAVENOUS PRN
Status: DISCONTINUED | OUTPATIENT
Start: 2022-08-12 | End: 2022-08-12 | Stop reason: SDUPTHER

## 2022-08-12 RX ORDER — SODIUM CHLORIDE, SODIUM LACTATE, POTASSIUM CHLORIDE, CALCIUM CHLORIDE 600; 310; 30; 20 MG/100ML; MG/100ML; MG/100ML; MG/100ML
INJECTION, SOLUTION INTRAVENOUS CONTINUOUS
Status: DISCONTINUED | OUTPATIENT
Start: 2022-08-12 | End: 2022-08-12 | Stop reason: HOSPADM

## 2022-08-12 RX ORDER — ASPIRIN 325 MG
325 TABLET ORAL 2 TIMES DAILY
Qty: 30 TABLET | Refills: 1 | Status: SHIPPED | OUTPATIENT
Start: 2022-08-12 | End: 2022-09-08

## 2022-08-12 RX ORDER — DEXAMETHASONE SODIUM PHOSPHATE 4 MG/ML
INJECTION, SOLUTION INTRA-ARTICULAR; INTRALESIONAL; INTRAMUSCULAR; INTRAVENOUS; SOFT TISSUE PRN
Status: DISCONTINUED | OUTPATIENT
Start: 2022-08-12 | End: 2022-08-12 | Stop reason: SDUPTHER

## 2022-08-12 RX ORDER — ONDANSETRON 4 MG/1
4 TABLET, FILM COATED ORAL EVERY 8 HOURS PRN
Qty: 10 TABLET | Refills: 0 | Status: SHIPPED | OUTPATIENT
Start: 2022-08-12

## 2022-08-12 RX ORDER — BUPIVACAINE HYDROCHLORIDE 5 MG/ML
INJECTION, SOLUTION EPIDURAL; INTRACAUDAL
Status: COMPLETED | OUTPATIENT
Start: 2022-08-12 | End: 2022-08-12

## 2022-08-12 RX ORDER — LIDOCAINE HYDROCHLORIDE 10 MG/ML
1 INJECTION, SOLUTION EPIDURAL; INFILTRATION; INTRACAUDAL; PERINEURAL
Status: DISCONTINUED | OUTPATIENT
Start: 2022-08-12 | End: 2022-08-12 | Stop reason: HOSPADM

## 2022-08-12 RX ORDER — OXYCODONE HYDROCHLORIDE AND ACETAMINOPHEN 5; 325 MG/1; MG/1
1 TABLET ORAL EVERY 6 HOURS PRN
Qty: 28 TABLET | Refills: 0 | Status: SHIPPED | OUTPATIENT
Start: 2022-08-12 | End: 2022-08-19

## 2022-08-12 RX ORDER — MEPERIDINE HYDROCHLORIDE 25 MG/ML
12.5 INJECTION INTRAMUSCULAR; INTRAVENOUS; SUBCUTANEOUS EVERY 5 MIN PRN
Status: DISCONTINUED | OUTPATIENT
Start: 2022-08-12 | End: 2022-08-12 | Stop reason: HOSPADM

## 2022-08-12 RX ORDER — OXYCODONE HYDROCHLORIDE 5 MG/1
5 TABLET ORAL PRN
Status: DISCONTINUED | OUTPATIENT
Start: 2022-08-12 | End: 2022-08-12 | Stop reason: HOSPADM

## 2022-08-12 RX ORDER — PROPOFOL 10 MG/ML
INJECTION, EMULSION INTRAVENOUS PRN
Status: DISCONTINUED | OUTPATIENT
Start: 2022-08-12 | End: 2022-08-12 | Stop reason: SDUPTHER

## 2022-08-12 RX ORDER — SODIUM CHLORIDE 9 MG/ML
INJECTION, SOLUTION INTRAVENOUS PRN
Status: DISCONTINUED | OUTPATIENT
Start: 2022-08-12 | End: 2022-08-12 | Stop reason: HOSPADM

## 2022-08-12 RX ORDER — ESMOLOL HYDROCHLORIDE 10 MG/ML
INJECTION INTRAVENOUS PRN
Status: DISCONTINUED | OUTPATIENT
Start: 2022-08-12 | End: 2022-08-12 | Stop reason: SDUPTHER

## 2022-08-12 RX ORDER — FENTANYL CITRATE 50 UG/ML
100 INJECTION, SOLUTION INTRAMUSCULAR; INTRAVENOUS ONCE
Status: COMPLETED | OUTPATIENT
Start: 2022-08-12 | End: 2022-08-12

## 2022-08-12 RX ORDER — OXYCODONE HYDROCHLORIDE 5 MG/1
10 TABLET ORAL PRN
Status: DISCONTINUED | OUTPATIENT
Start: 2022-08-12 | End: 2022-08-12 | Stop reason: HOSPADM

## 2022-08-12 RX ADMIN — MIDAZOLAM HYDROCHLORIDE 2 MG: 2 INJECTION, SOLUTION INTRAMUSCULAR; INTRAVENOUS at 10:21

## 2022-08-12 RX ADMIN — PHENYLEPHRINE HYDROCHLORIDE 100 MCG: 10 INJECTION, SOLUTION INTRAMUSCULAR; INTRAVENOUS; SUBCUTANEOUS at 12:51

## 2022-08-12 RX ADMIN — DEXAMETHASONE SODIUM PHOSPHATE 4 MG: 4 INJECTION, SOLUTION INTRAMUSCULAR; INTRAVENOUS at 12:16

## 2022-08-12 RX ADMIN — PROPOFOL 10 MG: 10 INJECTION, EMULSION INTRAVENOUS at 14:30

## 2022-08-12 RX ADMIN — SODIUM CHLORIDE, POTASSIUM CHLORIDE, SODIUM LACTATE AND CALCIUM CHLORIDE: 600; 310; 30; 20 INJECTION, SOLUTION INTRAVENOUS at 14:15

## 2022-08-12 RX ADMIN — ROCURONIUM BROMIDE 50 MG: 10 INJECTION, SOLUTION INTRAVENOUS at 12:23

## 2022-08-12 RX ADMIN — BUPIVACAINE 10 ML: 13.3 INJECTION, SUSPENSION, LIPOSOMAL INFILTRATION at 10:25

## 2022-08-12 RX ADMIN — SUGAMMADEX 200 MG: 100 INJECTION, SOLUTION INTRAVENOUS at 14:39

## 2022-08-12 RX ADMIN — PHENYLEPHRINE HYDROCHLORIDE 100 MCG: 10 INJECTION, SOLUTION INTRAMUSCULAR; INTRAVENOUS; SUBCUTANEOUS at 12:36

## 2022-08-12 RX ADMIN — FENTANYL CITRATE 25 MCG: 50 INJECTION INTRAMUSCULAR; INTRAVENOUS at 12:55

## 2022-08-12 RX ADMIN — PROPOFOL 10 MG: 10 INJECTION, EMULSION INTRAVENOUS at 14:33

## 2022-08-12 RX ADMIN — FENTANYL CITRATE 50 MCG: 50 INJECTION INTRAMUSCULAR; INTRAVENOUS at 13:21

## 2022-08-12 RX ADMIN — PHENYLEPHRINE HYDROCHLORIDE 100 MCG: 10 INJECTION, SOLUTION INTRAMUSCULAR; INTRAVENOUS; SUBCUTANEOUS at 12:46

## 2022-08-12 RX ADMIN — Medication 20 MG: at 12:22

## 2022-08-12 RX ADMIN — BUPIVACAINE HYDROCHLORIDE 20 ML: 5 INJECTION, SOLUTION EPIDURAL; INTRACAUDAL; PERINEURAL at 10:25

## 2022-08-12 RX ADMIN — CEFAZOLIN SODIUM 3000 MG: 10 INJECTION, POWDER, FOR SOLUTION INTRAVENOUS at 12:31

## 2022-08-12 RX ADMIN — FENTANYL CITRATE 100 MCG: 50 INJECTION INTRAMUSCULAR; INTRAVENOUS at 10:21

## 2022-08-12 RX ADMIN — ONDANSETRON 4 MG: 2 INJECTION INTRAMUSCULAR; INTRAVENOUS at 12:16

## 2022-08-12 RX ADMIN — EPHEDRINE SULFATE 5 MG: 50 INJECTION INTRAVENOUS at 13:02

## 2022-08-12 RX ADMIN — PROPOFOL 15 MG: 10 INJECTION, EMULSION INTRAVENOUS at 14:26

## 2022-08-12 RX ADMIN — PROPOFOL 10 MG: 10 INJECTION, EMULSION INTRAVENOUS at 14:35

## 2022-08-12 RX ADMIN — PROPOFOL 15 MG: 10 INJECTION, EMULSION INTRAVENOUS at 14:14

## 2022-08-12 RX ADMIN — GLYCOPYRROLATE 0.3 MG: 0.2 INJECTION, SOLUTION INTRAMUSCULAR; INTRAVENOUS at 12:36

## 2022-08-12 RX ADMIN — ROCURONIUM BROMIDE 10 MG: 10 INJECTION, SOLUTION INTRAVENOUS at 13:26

## 2022-08-12 RX ADMIN — FENTANYL CITRATE 25 MCG: 50 INJECTION INTRAMUSCULAR; INTRAVENOUS at 12:22

## 2022-08-12 RX ADMIN — SODIUM CHLORIDE, POTASSIUM CHLORIDE, SODIUM LACTATE AND CALCIUM CHLORIDE: 600; 310; 30; 20 INJECTION, SOLUTION INTRAVENOUS at 09:01

## 2022-08-12 RX ADMIN — PROPOFOL 20 MG: 10 INJECTION, EMULSION INTRAVENOUS at 13:21

## 2022-08-12 RX ADMIN — ESMOLOL HYDROCHLORIDE 20 MG: 10 INJECTION, SOLUTION INTRAVENOUS at 14:40

## 2022-08-12 RX ADMIN — PROPOFOL 120 MG: 10 INJECTION, EMULSION INTRAVENOUS at 12:22

## 2022-08-12 RX ADMIN — PROPOFOL 30 MG: 10 INJECTION, EMULSION INTRAVENOUS at 12:25

## 2022-08-12 RX ADMIN — EPHEDRINE SULFATE 10 MG: 50 INJECTION INTRAVENOUS at 13:04

## 2022-08-12 RX ADMIN — Medication 100 MG: at 12:22

## 2022-08-12 ASSESSMENT — PAIN DESCRIPTION - FREQUENCY: FREQUENCY: CONTINUOUS

## 2022-08-12 ASSESSMENT — PAIN DESCRIPTION - ORIENTATION: ORIENTATION: RIGHT

## 2022-08-12 ASSESSMENT — PAIN SCALES - GENERAL
PAINLEVEL_OUTOF10: 0
PAINLEVEL_OUTOF10: 6
PAINLEVEL_OUTOF10: 0

## 2022-08-12 ASSESSMENT — PAIN DESCRIPTION - LOCATION: LOCATION: SHOULDER

## 2022-08-12 ASSESSMENT — PAIN DESCRIPTION - DIRECTION: RADIATING_TOWARDS: DOWN RIGHT ARM

## 2022-08-12 ASSESSMENT — PAIN - FUNCTIONAL ASSESSMENT: PAIN_FUNCTIONAL_ASSESSMENT: PREVENTS OR INTERFERES WITH MANY ACTIVE NOT PASSIVE ACTIVITIES

## 2022-08-12 ASSESSMENT — PAIN DESCRIPTION - ONSET: ONSET: ON-GOING

## 2022-08-12 ASSESSMENT — PAIN DESCRIPTION - PAIN TYPE: TYPE: ACUTE PAIN

## 2022-08-12 ASSESSMENT — PAIN DESCRIPTION - DESCRIPTORS: DESCRIPTORS: ACHING;SHARP

## 2022-08-12 NOTE — ANESTHESIA PRE PROCEDURE
Department of Anesthesiology  Preprocedure Note       Name:  Charity Goff   Age:  79 y.o.  :  1955                                          MRN:  [id number]         Date:  2022      Surgeon: Radha Aguirre):  Winston Schmidt MD    Procedure: Procedure(s):  RIGHT SHOULDER ARTHROSCOPIC ROTATOR CUFF REPAIR    Medications prior to admission:   Prior to Admission medications    Medication Sig Start Date End Date Taking? Authorizing Provider   Multiple Vitamins-Minerals (THERAPEUTIC MULTIVITAMIN-MINERALS) tablet Take 1 tablet by mouth in the morning. Yes Historical Provider, MD   aspirin 81 MG EC tablet Take 81 mg by mouth in the morning. Yes Historical Provider, MD   loratadine (CLARITIN) 10 MG tablet Take 10 mg by mouth in the morning.     Historical Provider, MD   cyclobenzaprine (FLEXERIL) 10 MG tablet Take 10 mg by mouth 3 times daily as needed 22   Historical Provider, MD   rosuvastatin (CRESTOR) 20 MG tablet Take 20 mg by mouth daily 22   Historical Provider, MD   meloxicam (MOBIC) 15 MG tablet Take 1 tablet by mouth daily as needed for Pain 22   Judy Dodd MD       Current medications:    Current Facility-Administered Medications   Medication Dose Route Frequency Provider Last Rate Last Admin    ceFAZolin (ANCEF) 3000 mg in dextrose 5 % 100 mL IVPB  3,000 mg IntraVENous Once Winston Schmidt MD        lidocaine PF 1 % injection 1 mL  1 mL IntraDERmal Once PRN Juana Hill MD        lactated ringers infusion   IntraVENous Continuous Juana Hill  mL/hr at 22 0901 New Bag at 22 0901    sodium chloride flush 0.9 % injection 5-40 mL  5-40 mL IntraVENous 2 times per day Juana Hill MD        sodium chloride flush 0.9 % injection 5-40 mL  5-40 mL IntraVENous PRN Juana Hill MD        0.9 % sodium chloride infusion   IntraVENous PRN Juana Hill MD        midazolam (VERSED) injection 2 mg  2 mg IntraVENous Once Juana Hill MD        fentaNYL (SUBLIMAZE) injection 100 mcg  100 mcg IntraVENous Once Marycruz Standing, MD        bupivacaine (PF) (MARCAINE) 0.5 % injection 150 mg  30 mL Infiltration Once Marycruz Standing, MD           Allergies:  No Known Allergies    Problem List:    Patient Active Problem List   Diagnosis Code    Hypercholesteremia E78.00       Past Medical History:        Diagnosis Date    Arthritis     Hyperlipidemia        Past Surgical History:        Procedure Laterality Date    SHOULDER ARTHROSCOPY W/ ROTATOR CUFF REPAIR Right 2013    with bicep -- done by Dr. Kingsley Mas History:    Social History     Tobacco Use    Smoking status: Former     Packs/day: 1.00     Types: Cigarettes     Quit date:      Years since quittin.    Smokeless tobacco: Former     Quit date:    Substance Use Topics    Alcohol use: Yes     Alcohol/week: 3.0 standard drinks     Types: 3 Cans of beer per week                                Counseling given: Not Answered      Vital Signs (Current):   Vitals:    22 0912 22 0835   BP:  132/86   Pulse:  56   Resp:  16   Temp:  97.1 °F (36.2 °C)   TempSrc:  Temporal   SpO2:  97%   Weight: 187 lb (84.8 kg) 182 lb 9.6 oz (82.8 kg)   Height: 5' 10\" (1.778 m) 5' 10\" (1.778 m)                                              BP Readings from Last 3 Encounters:   22 132/86   19 126/75       NPO Status: Time of last liquid consumption: 2100                        Time of last solid consumption: 1800                        Date of last liquid consumption: 22                        Date of last solid food consumption: 22    BMI:   Wt Readings from Last 3 Encounters:   22 182 lb 9.6 oz (82.8 kg)   22 178 lb (80.7 kg)   22 178 lb (80.7 kg)     Body mass index is 26.2 kg/m².     CBC: No results found for: WBC, RBC, HGB, HCT, MCV, RDW, PLT    CMP:   Lab Results   Component Value Date/Time     2022 02:37 PM    K 4.3 2022 02:37 PM     06/29/2022 02:37 PM    CO2 24 06/29/2022 02:37 PM    BUN 23 06/29/2022 02:37 PM    CREATININE 1.21 06/29/2022 02:37 PM    AGRATIO 1.5 06/27/2022 09:47 AM    GLUCOSE 175 06/29/2022 02:37 PM    PROT 7.7 06/27/2022 09:47 AM    CALCIUM 9.1 06/29/2022 02:37 PM    BILITOT 0.4 06/27/2022 09:47 AM    ALKPHOS 68 06/27/2022 09:47 AM    AST 31 06/27/2022 09:47 AM    ALT 27 06/27/2022 09:47 AM       POC Tests: No results for input(s): POCGLU, POCNA, POCK, POCCL, POCBUN, POCHEMO, POCHCT in the last 72 hours. Coags: No results found for: PROTIME, INR, APTT    HCG (If Applicable): No results found for: PREGTESTUR, PREGSERUM, HCG, HCGQUANT     ABGs: No results found for: PHART, PO2ART, KSG5LZR, WOK8OBN, BEART, Y1PLUULR     Type & Screen (If Applicable):  No results found for: LABABO, LABRH    Drug/Infectious Status (If Applicable):  No results found for: HIV, HEPCAB    COVID-19 Screening (If Applicable): No results found for: COVID19        Anesthesia Evaluation  Patient summary reviewed and Nursing notes reviewed no history of anesthetic complications:   Airway: Mallampati: II  TM distance: >3 FB   Neck ROM: full  Mouth opening: > = 3 FB   Dental:          Pulmonary:Negative Pulmonary ROS                              Cardiovascular:    (+) hyperlipidemia                  Neuro/Psych:   Negative Neuro/Psych ROS              GI/Hepatic/Renal: Neg GI/Hepatic/Renal ROS            Endo/Other:    (+) : arthritis:., .                 Abdominal:             Vascular: negative vascular ROS. Other Findings:           Anesthesia Plan      general     ASA 3    (70-year-old male presents for RIGHT SHOULDER ARTHROSCOPIC ROTATOR CUFF REPAIR. Plan general anesthesia with ASA standard monitors; interscalene brachial plexus block for postoperative analgesia upon request of the attending surgeon. Questions answered. Patient agreeable with anesthetic plan.   )  Induction: intravenous.       Anesthetic plan and risks discussed with patient. Plan discussed with CRNA.     Attending anesthesiologist reviewed and agrees with Phu Hickman MD   8/12/2022

## 2022-08-12 NOTE — PROGRESS NOTES
Ambulatory Surgery/Procedure Discharge Note    Vitals:    08/12/22 1610   BP: 120/84   Pulse: 58   Resp: 20   Temp: 97 °F (36.1 °C)   SpO2: 95%       In: 1560 [P.O.:460; I.V.:1100]  Out: 25     Restroom use offered before discharge. Yes/voided after walking to BR    Pain assessment:  none  Pain Level: 0    Right arm immobilizer on. Clean right shoulder dressing. Right fingers move and have some sensation and are warm , pink with instant refill. Elbow lower than hand. Discharge instructions reviewed. Patient and wife in room educated, using the teach back method, about follow up instructions and discharge instructions. A completed copy of the AVS instructions given to patient and all questions answered. Home with urinal, ice bags, arm in immobilizer. Aware to call about when /if to have PT prior to MD visit . Aware when to follow up with Dr Imtiaz Martin. Left finger with ring in place. Patient discharged to home/self care.  Patient discharged via wheel chair by me to waiting family/S.O.       8/12/2022 5:08 PM

## 2022-08-12 NOTE — ANESTHESIA PROCEDURE NOTES
Peripheral Block    Patient location during procedure: pre-op  Reason for block: post-op pain management and at surgeon's request  Start time: 8/12/2022 10:21 AM  End time: 8/12/2022 10:28 AM  Staffing  Performed: anesthesiologist   Anesthesiologist: Valeria Bazzi MD  Preanesthetic Checklist  Completed: patient identified, IV checked, site marked, risks and benefits discussed, surgical/procedural consents, equipment checked, pre-op evaluation, timeout performed, anesthesia consent given, oxygen available and monitors applied/VS acknowledged  Peripheral Block   Patient position: sitting  Prep: ChloraPrep  Provider prep: mask and sterile gloves  Patient monitoring: cardiac monitor, continuous pulse ox, frequent blood pressure checks and IV access  Block type: Brachial plexus  Interscalene  Laterality: right  Injection technique: single-shot  Guidance: ultrasound guided    Needle   Needle type: short-bevel   Needle gauge: 22 G  Needle localization: ultrasound guidance  Needle length: 8 cm  Assessment   Injection assessment: negative aspiration for heme, no paresthesia on injection, local visualized surrounding nerve on ultrasound and no intravascular symptoms  Paresthesia pain: none  Slow fractionated injection: yes  Hemodynamics: stable  Real-time US image taken/store: yes  Outcomes: uncomplicated and patient tolerated procedure well    Additional Notes  Immediately prior to procedure a \"time out\" was called to verify the correct patient, allergies, laterality, procedure and equipment. Time out performed with RN. Local Anesthetic: See below    Anterior scalene and middle scalene muscles, upper, middle and lower trunks of the brachial plexus are identified, the tip of the needle and the spread of the local anesthetic around the brachial plexus are visualized. The Brachial plexus appeared to be anatomically normal and there were no abnormal pathologically findings seen.      Right Ultrasound-Guided Interscalene Brachial Plexus Block    Indication: Postoperative analgesia upon request of the attending surgeon. Immediately prior to procedure a \"time out\" was called to verify the correct patient, allergies, laterality, procedure and equipment. Time out performed with RN. Local Anesthetic: See below    Anterior scalene and middle scalene muscles, upper, middle and lower trunks of the brachial plexus are identified, the tip of the needle and the spread of the local anesthetic around the brachial plexus are visualized. The Brachial plexus appeared to be anatomically normal and there were no abnormal pathologically findings seen. Procedure: Informed consent obtained and pre-procedural timeout procedure performed. Patient supine in semi-upright position. Landmarks identified. Sterile prep. Right brachial plexus was identified using an ultrasound probe and an 80mm 22G insulated regional block needle was inserted posterior to the right interscalene groove at the level of the C6 tubercle and directed in an anterior manner toward the brachial plexus. The needle was visualized on ultrasound as it entered the plexus sheath. Bupivacaine 0.5%-20cc and Exparel 1.3%-10cc were mixed together and injected in 5cc increments to a total volume of 30cc after aspiration was performed prior to each increment and found to be negative for both heme and CSF. Block was tolerated well by the patient. There were no apparent complications at the time of the block. Medications Administered  bupivacaine (PF) 0.5 % - Perineural   20 mL - 8/12/2022 10:25:00 AM  bupivacaine liposome 1.3 % - Perineural   10 mL - 8/12/2022 10:25:00 AM      Musa CHAVIRA MD  August 12, 2022 10:40 AM

## 2022-08-12 NOTE — DISCHARGE INSTR - OTHER ORDERS
POST OP NERVE BLOCK CARE    You had a nerve block in your right Shoulder for pain control by the Anestheologist.  This block will last an average of 8-24 hours after initially receiving the block. Your limb will feel heavy to you. It may feel \"numb\" or \"dead\" to you. This is normal.       SHOULDER NERVE BLOCK    If the block is in your shoulder, keep arm in sling at all times until the block has worn completely off. When you begin to feel a tingling sensation in your hand, that is the time to take your first dose of your pain medicine. Make sure you take it with food. The block will wear off quickly once it starts to go away.    ________________________________________________  Your Surgeon or Anesthesiologist gave you Exparel     Exparel (bupivicaine liposome injectable suspension) is a medication injected into the surgical incision  just before the end of the procedure by your surgeon to help control your pain after surgery. It can also be given as a nerve block by the anesthesiologist. This local anesthetic provides pain relief by numbing the tissue around the surgical site. Exparel releases pain medication over time lasting up to 5 days or 120 hours. Exparel may cause a temporary loss of sensation or the ability to move in the area where the medication was injected. Side effects of Exparel that may occur include nausea, vomiting or constipation. Call immediately if you experience numbness or tingling of the mouth or lips, lightheadedness or severe anxiety. Notify your physician if you experience these or any other possible side effects of the medication. Note: Other forms of bupivacaine should not be administered within 96 hours (4 days) following administration of Exparel. Please keep ID band in place for 96 hours (4 days).

## 2022-08-12 NOTE — PROGRESS NOTES
Procedure(s):  RIGHT SHOULDER EXAM UNDER ANESTHESIA, DIAGNOSTIC ARTHROSCOPY, LYSIS OF ADHESIONS, ARTHROSCOPIC EXTENSIVE DEBRIDEMENT, REMOVAL OF RETAINED SUTURE AND SUTURE ANCHORS, ARTHROSCOPIC ROTATOR CUFF REPAIR, REVISION ROTATOR CUFF REPAIR WITH PATCH AUGMENTATION AND REVISION DECOMPRESSION    Current Allergies: Patient has no known allergies. No results for input(s): POCGLU in the last 72 hours. Admitted to PACU bed 9 from OR. Arrived on a stretcher . Attached to PACU monitoring system. Alarms and parameters set. Report received from anesthesia personnel. OR staff did not report skin issues that were observed while in OR  Pt arrived with oxygen per non-rebreather face mask with oxygen at 10 liters. Right arm in shoulder immobilizer  Athrombic wraps in place.

## 2022-08-12 NOTE — DISCHARGE INSTRUCTIONS
Dr. Himanshu Guo Discharge Instructions    Take pain medication as directed. If taking narcotic pain medication, do not take tylenol with this as there is tylenol in percocet/norco. Do not operate machinery while taking narcotic pain medications  Non weight bearing to effected extremity  Maintain sling until follow up or therapy. OK to remove sling several times a day to move elbow and wrist, no shoulder motion unless directed by therapy or Dr. Beverly Gross. Therapy as instructed. You should receive a call regarding therapy  Resume regular diet  May take down dressing in 72 hours and replace with a clean dressing  May shower after 72 hours. Avoid any submersion of operative extremity. Avoid any hot tubs, tub baths, pools, lakes, ocean ect. Avoid contact with dishwater or dirty water. Call immediately if any increasing redness or drainage, any fevers. Colace for constipation  zofran for nausea  Aspirin 325mg 2 times daily for next 14 days to decrease risk of blood clots    Dr. Nlia Grey and Grace  Call [phone] for appointment       8/12/2022 [facility] INSTRUCTIONS    There are potential side effects of anesthesia or sedation you may experience for the first 24 hours. These side effects include:    Confusion or Memory loss, Dizziness, or Delayed Reaction Times   [x]A responsible person should be with you for the next 24 hours. Do not operate any vehicles (automobiles, bicycles, motorcycles) or power tools or machinery for 24 hours. Do not sign any legal documents or make any legal decisions for 24 hours. Do not drink alcohol for 24 hours or while taking narcotic pain medication. Nausea/Diet  [x] Nausea is not uncommon after having general anesthesia. Start with light diet and progress to your normal diet as you feel like eating.  However, if you experience nausea or repeated episodes of vomiting which persist beyond 12-24 hours, notify your physician. Once nausea has passed, remember to keep drinking fluids. Difficulty Passing Urine  [x]Drink extra amounts of fluid today. Notify your physician if you have not urinated within 8 hours after your procedure or you feel uncomfortable. Irritated Throat from a Breathing Tube  [x]Drink extra amounts of fluid today. Lozenges may help. Muscle Aches  [x]You may experience some generalized body aches as your muscles recover from medications used to relax them during surgery. These will gradually subside. ACTIVITY INSTRUCTIONS:  [x]Rest today. Increase activity as tolerated. [x]No heavy lifting or strenuous activity  [x]No driving until cleared by your surgeon  [x]No driving while on narcotic pain medications or for 24 hours. Otherwise, you can drive when you can sit comfortably behind the steering wheel and can slam on the brake without it hurting or turn the wheel sharply without it hurting. Practice this while sitting in the car with it parked in your driveway before trying to drive. POST OPERATION WOUND CARE INSTRUCTIONS:       Follow instructions as instructed verbally and written by your Surgeon. Call Doctor for any questions or concerns at their office number. Someone will answer the phone 24hrs/7 days a week. MEDICATION INSTRUCTIONS:  Prescription(S) x  6  were sent to your local pharmacy. They should be ready to  when discharged from hospital.  Use as directed. Refer to your pharmacy's medication information sheets or your pharmacist for any questions you may have about the medicines prescribed. When taking pain medications, you may experience the side effect of dizziness or drowsiness. Do not drink alcohol or drive when taking these medications.   If no prescriptions were sent home with you, you may take over the counter medications as needed for your discomfort unless your doctor directs differently    [x] You may resume all medications you were taking before surgery  [x] Give the list of your medications to your primary care physician on your next visit. Keep your med list updated and carry it with in case of emergencies. [x] If you take any blood thinning medication, such as Coumadin or Plavix, check with your surgeon on when to re-start taking it. [x] Narcotic pain medications can cause significant constipation. You may want to add a stool softener to your postoperative medication schedule or speak to your surgeon on how best to manage this side effect. NARCOTIC SAFETY:  Your pain medicine is only for you to take. Safely store your medicines. Store pills up high and out of reach of children and pets. Ensure safety caps are snapped tightly  Keep track of how many pills you have left    Unused medication can be disposed of by taking them to a drop-off box or take-back program that is authorized by the [facility]. Access to a site near you can be found on the [facility] [place] by [facility] [employer] website ([address] [email or web address]). If you have a CPAP machine, it is very important that you use it daily during all periods of sleep and daytime rest during your recovery at home. Surgery and Anesthesia place a significant amount of stress on your body. Using your CPAP will help keep you safe and lessen the negative effects of that stress. If you smoke STOP. Smoking can interfere with healing and your respiratory health after surgery. We care about your health! Watch for these significant complications. Call physician if they or any other problems occur:  Fever over 101 F°    Redness, swelling, hardness or warmth at the operative site  Unrelieved nausea    Foul smelling or cloudy drainage at the operative site   Unrelieved pain after taking medications as prescribed by your doctor    Blood soaked dressing.  (Some oozing may be normal)  Numb, pale, blue, cold or tingling extremity  You have prolonged anesthesia/sedation side effects          FAQs  (frequently asked questions)  About Surgical Site Infections    What is a Surgical Site Infection (SSI)? A surgical site infection is an infection that occurs after surgery in the part of the body where the surgery took place. Most patients who have surgery do not develop an infection. However, infections develop in about 1 to 3 out of every 100 patients who have surgery. Some common symptoms of a surgical site infection are:   Redness and pain around the area where you had surgery  Drainage of cloudy fluid from your surgical wound   Fever    Can SSIs be treated? Yes. Most surgical site infections can be treated with antibiotics. The antibiotic given to you depends on the bacteria (germs) causing the infection. Sometimes patients with SSIs also need another surgery to treat the infection. What are some of the things that hospitals are doing to prevent SSIs? To prevent SSIs, doctors, nurses and other healthcare providers:   Clean their hands and arms up to their elbows with an antiseptic agent just before the surgery. Clean their hands with soap and water or an alcohol-based hand rub before and after caring for each patient. May remove some of your hair immediately before your surgery using electric clippers if the hair is in the same area where the procedure will occur. They should not shave you with a razor. Wear special hair covers, masks, gowns, and gloves during surgery to keep the surgery area clean. Give you antibiotics before your surgery starts. In most cases, you should get antibiotics within 60 minutes before the surgery starts and the antibiotics should be stopped within 24 hours after surgery. Clean the skin at the site of your surgery with a special soap that kills germs. What can I do to help prevent SSIs? Before you surgery:  Tell your doctor about other medical problems you may have.   Health problems such as allergies, diabetes, and obesity could affect your surgery and your treatment. Quit smoking. Patients who smoke get more infections. Talk to your doctor about how you can quit before your surgery. Do not shave near where you will have surgery. Shaving with a razor can irritate your skin and make it easier to develop an infection. At the time of your surgery:  Speak up if someone tries to shave you with a razor before surgery. Ask why you need to be shaved and talk with your surgeon if you have any concerns. Ask if you will get antibiotics before surgery. After your surgery:  Make sure that your healthcare providers clean their hands before examining you, either with soap and water or an alcohol-based hand rub. IF YOU DO NOT SEE YOUR PROVIDERS CLEAN THEIR HANDS, PLEASE ASK THEM TO DO SO. Family and friends who visit you should not touch the surgical wound or dressings. Family and friends should clean their hands with soap and water or an alcohol-based hand rub before and after visiting you. If you do not see them clean their hands, ask them to clean their hands. What do I need to do when I go home from the hospital?  Before you go home, your doctor nurses should explain everything you need to know about taking care of your wound. Make sure you understand how to care for your wound before you leave the hospital.    Always clean your hands before and after caring for your wound. Before you go home, make sure you know who to contact if you have questions or problems after you get home. If you have any symptoms of an infection, such as redness and pain at the surgery site, drainage, or fever, call your doctor immediately. If you have additional questions, please ask your doctor or nurse.

## 2022-08-12 NOTE — ANESTHESIA POSTPROCEDURE EVALUATION
Department of Anesthesiology  Postprocedure Note    Patient: Mick Beach  MRN: [id number]  [place]: 1955  Date of evaluation: 8/12/2022      Procedure Summary     Date: 08/12/22 Room / Location: [address] [place] [address] [id number]    Anesthesia Start: 3618 Anesthesia Stop: 9792    Procedure: RIGHT SHOULDER EXAM UNDER ANESTHESIA, DIAGNOSTIC ARTHROSCOPY, LYSIS OF ADHESIONS, ARTHROSCOPIC EXTENSIVE DEBRIDEMENT, REMOVAL OF RETAINED SUTURE AND SUTURE ANCHORS, ARTHROSCOPIC ROTATOR CUFF REPAIR, REVISION ROTATOR CUFF REPAIR WITH PATCH AUGMENTATION AND REVISION DECOMPRESSION (Right) Diagnosis:       Traumatic tear of right rotator cuff, subsequent encounter      (Traumatic tear of right rotator cuff, subsequent encounter [S46.011D])    Surgeons: Bernabe Cottrell MD Responsible Provider: Marli Minor DO    Anesthesia Type: general ASA Status: 2          Anesthesia Type: No value filed.     Kristi Phase I: Kristi Score: 10    Kristi Phase II:        Anesthesia Post Evaluation    Patient location during evaluation: PACU  Patient participation: complete - patient participated  Level of consciousness: awake and alert  Pain score: 0  Airway patency: patent  Nausea & Vomiting: no nausea and no vomiting  Complications: no  Cardiovascular status: hemodynamically stable  Respiratory status: acceptable  Hydration status: stable

## 2022-08-12 NOTE — PROGRESS NOTES
Dr. Slater Pod administered right shoulder block with Exparel. Versed 2 mg and Fentanyl 100 mcg given, and pt tolerated all well. Green Exparel bracelet placed on pt's wrist, and teaching given to pt and wife at bedside to keep bracelet on for 96 hours, and both verbalized understanding. Pt has call light within reach. Shoulder splint/immobilizer on bed to go to OR. Have given report to Domingo Valdez, RAGINI.

## 2022-08-12 NOTE — H&P
Monica Carter    [id number]    [facility] Same Day Surgery Update H & P  Department of General Surgery   Surgical Service   Pre-operative History and Physical  Last H & P within the last 30 days. DIAGNOSIS:   Traumatic tear of right rotator cuff, subsequent encounter [S46.011D]    Procedure(s):  RIGHT SHOULDER ARTHROSCOPIC ROTATOR CUFF REPAIR     History obtained from: Patient interview and EHR     HISTORY OF PRESENT ILLNESS:   The patient is a 79 y.o. male with c/o right shoulder pain, weakness and limited ROM in the setting of MRI demonstrated rotator cuff tear. Their symptoms have been recalcitrant to conservative treatment and the patient presents today for the above procedure. Illness Screening: Patient denies fever, chills, worsening cough, or close contact with sick individuals. Past Medical History:        Diagnosis Date    Arthritis      Past Surgical History:        Procedure Laterality Date    TONSILLECTOMY         Medications Prior to Admission:      Prior to Admission medications    Medication Sig Start Date End Date Taking? Authorizing Provider   loratadine (CLARITIN) 10 MG tablet Take 10 mg by mouth in the morning. Historical Provider, MD   cyclobenzaprine (FLEXERIL) 10 MG tablet Take 10 mg by mouth 3 times daily as needed  Patient not taking: Reported on 8/8/2022 6/27/22   Historical Provider, MD   rosuvastatin (CRESTOR) 20 MG tablet Take 20 mg by mouth daily 6/27/22   Historical Provider, MD   meloxicam (MOBIC) 15 MG tablet Take 1 tablet by mouth daily as needed for Pain  Patient not taking: Reported on 8/8/2022 6/30/22   Jennifer Fallon MD         Allergies:  Patient has no known allergies.     PHYSICAL EXAM:      /86   Pulse 56   Temp 97.1 °F (36.2 °C) (Temporal)   Resp 16   Ht 5' 10\" (1.778 m)   Wt 182 lb 9.6 oz (82.8 kg)   SpO2 97%   BMI 26.20 kg/m²      Airway:  Airway patent with no audible stridor    Heart:  Regular rate and rhythm, No murmur noted    Lungs: No increased work of breathing, good air exchange, clear to auscultation bilaterally, no crackles or wheezing    Abdomen:  Soft, non-distended, non-tender, no masses palpated    ASSESSMENT AND PLAN    Patient is a 79 y.o. male with above specified procedure planned. 1.  The patients history and physical was obtained and signed off by the pre-admission testing department. Patient seen and focused exam done today- no new changes since last physical exam on 7/28/22    2. Access to ancillary services are available per request of the provider.     Macho Stanford, MARIETTA - CNP     8/12/2022

## 2022-08-12 NOTE — PROGRESS NOTES
Pt to [facility] for right shoulder surgery. Pt is alert; oriented X 4; speech clear; breathing easily on RA; walks with steady gait without assist.  States pain in right shoulder  is 6/10 radiating down right arm and has limited movement in shoulder. Right shoulder clipped in SDS and scrubbed with MAYRA wipes 15 minutes later. Compression stockings placed. #20 IV placed in left hand. IS teaching done, and goal was 1129 and pt achieved 1250. Dr. Eliana Aragon has been to bedside to talk with pt and wife, and will do a block. Ancef 3 g IVPB will go to OR with pt, along with black shoulder splint/immobilizer. Has call light within reach.

## 2022-08-12 NOTE — PROGRESS NOTES
PACU Transfer to [facility]    Procedure(s):  RIGHT SHOULDER EXAM UNDER ANESTHESIA, DIAGNOSTIC ARTHROSCOPY, LYSIS OF ADHESIONS, ARTHROSCOPIC EXTENSIVE DEBRIDEMENT, REMOVAL OF RETAINED SUTURE AND SUTURE ANCHORS, ARTHROSCOPIC ROTATOR CUFF REPAIR, REVISION ROTATOR CUFF REPAIR WITH PATCH AUGMENTATION AND REVISION DECOMPRESSION    Pt's Current Allergies: Patient has no known allergies. Pt meets criteria to transfer to next phase of care per [facility] and [facility] standards    No results for input(s): POCGLU in the last 72 hours. Vitals:    08/12/22 1545   BP: 118/77   Pulse: 59   Resp: 22   Temp: 97.8 °F (36.6 °C)   SpO2: 94%         Intake/Output Summary (Last 24 hours) at 8/12/2022 1557  Last data filed at 8/12/2022 1545  Gross per 24 hour   Intake 1200 ml   Output 25 ml   Net 1175 ml       Pain assessment:  none  Pain Level: 0    Patient was assessed for unknown alterations to skin integrity. There were not unknown alterations observed. Patient transferred to care of Constantino Vargas RN.    Family updated and directed to Constantino Vargas    8/12/2022 3:57 PM

## 2022-08-13 NOTE — OP NOTE
[facility] [address] [address], [address]                                OPERATIVE REPORT    PATIENT NAME: Shashank Haque                        :        1955  MED REC NO:   [id number]                          ROOM:  ACCOUNT NO:   [de-identified]                           ADMIT DATE: 2022  PROVIDER:     Alcira Dvais MD    DATE OF PROCEDURE:  2022    PREOPERATIVE DIAGNOSES:  Right shoulder impingement, recurrent rotator  cuff tear. POSTOPERATIVE DIAGNOSES:  Right shoulder impingement, recurrent rotator  cuff tear with extensive postsurgical and posttraumatic adhesions,  residual impingement, early osteoarthritis. OPERATIONS PERFORMED:  Right shoulder examination under anesthesia;  diagnostic arthroscopy; arthroscopic extensive debridement of rotator  cuff, biceps stump, labrum, glenohumeral arthritis, rotator interval,  subacromial bursa; arthroscopic lysis of deltoid and subacromial  adhesions; arthroscopic revision; subacromial decompression with  acromioplasty; arthroscopic removal of retained sutures from prior  repair; arthroscopic revision of rotator cuff repair with collagen patch  augmentation. ANESTHESIA:  General anesthesia, interscalene block. IV FLUIDS:  1000 mL of crystalloids. ESTIMATED BLOOD LOSS:  25 mL. COMPLICATIONS:  None. SURGEON:  Alcira Davis MD    ASSISTANTS:  Radha Castle DO and Radha Banda MD  The availability of at least one of these two skilled assistants was  critically important to execute his complex all arthroscopic procedure. They performed suture retrieval and management and held the arthroscope  during suturing and knot tying. IMPLANTS:  Arthrex BioComposite Corkscrew anchor 5.5 fully threaded  anchors x3 and one [employer]hew large Regeneten collagen patch  graft. FINDINGS:  Examination under anesthesia revealed no focal motion  deficits.   Diagnostic arthroscopy revealed extensive postsurgical and  posttraumatic adhesions, recurrent rotator cuff tear, retained Ethibond  sutures. Subscapularis was intact. Some labral fraying with mild  glenohumeral arthritis, extensive adhesions as well irregular  undersurface of the acromion amenable to revision of subacromial  decompression with adequate length, however, single row repair. This  was augmented by marrow venting laterally and patch augmentation. BRIEF HISTORY AND PRESENTING ILLNESS:  As follows: The patient is a  71-year-old gentleman who roughly 10 years ago had undergone primary  rotator cuff repair on the right side, which I performed and did well  for many years. Started having pain. An MRI showed recurrent rotator  cuff tear and quite a bit of weakness was correlated with the MRI. I  recommended surgical intervention. He is an active young man who still  wanted to regain the ability to do strength overhead and other  activities. He understood the potential risks and benefits of revision  surgery. He understood risks such as bleeding, infection, anesthetic  risks, injury to nerve and blood vessels, stiffness, weakness,  incomplete pain relief, and need for further surgery. He understood all  those risks and wished to proceed and gave informed consent. He was  scheduled on an elective basis after appropriate preoperative medical  clearance. DESCRIPTION OF PROCEDURE:  On the date of the procedure, the patient was  brought to the operating room and placed supine on the operating table. General anesthesia was established. Preoperative antibiotics and  interscalene block had been given in the holding area. Under  anesthesia, exam was carried out with the findings as above. The  patient was positioned using a Tenet beach-chair position in a sitting  position. All pressure points were padded.   The patient's right  shoulder and arm were prepped and draped in a standard manner for  arthroscopic shoulder surgery. We used a TenImnish Spider arm baxter to  hold the arm in position. We marked previous arthroscopic portals and  used these as needed. We began diagnostic arthroscopy. Arthroscope was  introduced into the glenohumeral joint through a standard posterior  portal.  A systematic diagnostic arthroscopy ensued with findings as  noted above. We established an anterior portal over the rotator  interval.  We inserted our arthroscopic shaver across the metal cannula. This was used to debride superior labrum and anterior labrum to stable  margin. We debrided some chondral fraying. We debrided some rotator  interval scarring. We used cautery to open up the rotator interval down  at the base of the coracoid. We could see the comma tissue. The  subscapularis was intact. We debrided some undersurface fraying of the  rotator cuff. We removed the couple of sutures. We redirected the  arthroscope through the same posterior portal above the rotator cuff and  into the subacromial space. We established a lateral portal under  direct visualization and dilated with arthroscopic shaver and performed  a thorough bursectomy. We released the bursal adhesions with cautery  and shaver. We placed arthroscope laterally, completed the bursectomy  posterior and posterolaterally using a shaver and a cautery device. We  established accessory anterolateral portal to allow us to grasp on to  the tendons, separate from the scar overlying which was excised. We  placed arthroscope posteriorly and through the anterolateral portal, we  resected distal bursal tissue. Through the lateral portal, we lightly  abraded the rotator cuff footprint and rounded off the tuberosity. We  released the adhesions. We removed some of the scar on the external  surface of the rotator cuff. This was  from underlying rotator  cuff. We could see there was significant tendon loss anteriorly.   We  released the coracohumeral ligament with cautery. We did a revision  acromioplasty in cutting block technique. While viewing through lateral  portal, we brought the kinga from posteroanterior and smoothed out the  undersurface converted essentially to a flat type 1 acromion. We  established the posterolateral portal for ankle placement. While  viewing through the posterior portal, we used accessory portals to place  three Arthrex BioComposite corkscrew anchors, the anterior and middle  one were about 2 to 3 mm to articular margin, one posterior was a little  bit more lateral.  These were all loaded with three #2 Arthrex sutures and  all these sutures were passed through the rotator cuff. We started with  the middle anchor. We placed the PassPort cannula laterally and through  that, we retrieved one limb of one of the sutures from the middle  anchor. We loaded onto an Arthrex FastPass Scorpion suture. We passed  suture through the central and medial aspect of the tear near  muscle-tendon junction. We parked the suture tails anteriorly. I  repeated the process identically with the second and third suture. We  then passed the sutures from the posterior anchor a little bit further  posterior and medial.  Then, anchor was placed a little bit more  laterally because we had a little bit more tendon. We had three simple  stitches there. In the anterior anchor, the suture was passed far  anterior including through the rotator interval cable and these brought  the number of stitches across the rotator cuff to nine. We tied all our  sutures, starting in the midline anchor to reduce the tear. We used a  Revo knot followed by alternating half hitches. We tied one stitch and  the other, then the other. We then brought the arm internally rotated  and tied the stitches from the posterior anchor and then finally an  excellent rotation of the sutures from the anterior anchor. We had nine  simple stitches on the lateral side.   We had really nice reconstitution  of the medial footprint. Laterally, there was obvious tendon or tissue  loss. We used power pick from Arthrex for marrow venting. We then  deployed the Regeneten patch from lateral.  We fixed it to tendon using  multiple PLLA staples and to bone using two bone staples, one  anterolateral and one posterolateral.  Withdraw all arthroscopic  instruments. We closed the arthroscopic portals using Monocryl closure  and Prineo dressing. Sterile compressions dressing applied. The arm  was placed in a padded soft brace. The patient was repositioned in  supine position before this and promptly awakened from anesthesia,  having tolerated the procedure well, and was taken from the operating  room to the recovery room in satisfactory condition. PLAN:  The patient will be discharged on oral analgesics with  instructions to begin outpatient physical therapy and delayed rehab  program.  Restrict in range of motion for three weeks and then follow up  with me in the office in one week.         Jose Kong MD    D: 08/12/2022 14:40:24       T: 08/12/2022 20:50:48     [id number]  Job#: [id number]     Doc#: [id number]    CC:

## 2022-08-25 ENCOUNTER — TELEPHONE (OUTPATIENT)
Dept: ORTHOPEDIC SURGERY | Age: 67
End: 2022-08-25

## 2022-08-25 ENCOUNTER — OFFICE VISIT (OUTPATIENT)
Dept: ORTHOPEDIC SURGERY | Age: 67
End: 2022-08-25

## 2022-08-25 VITALS — BODY MASS INDEX: 26.05 KG/M2 | WEIGHT: 182 LBS | HEIGHT: 70 IN

## 2022-08-25 DIAGNOSIS — M25.511 RIGHT SHOULDER PAIN, UNSPECIFIED CHRONICITY: ICD-10-CM

## 2022-08-25 DIAGNOSIS — Z98.890 S/P ROTATOR CUFF REPAIR: Primary | ICD-10-CM

## 2022-08-25 PROCEDURE — 99024 POSTOP FOLLOW-UP VISIT: CPT | Performed by: ORTHOPAEDIC SURGERY

## 2022-08-25 RX ORDER — OXYCODONE HYDROCHLORIDE AND ACETAMINOPHEN 5; 325 MG/1; MG/1
1 TABLET ORAL EVERY 8 HOURS PRN
Qty: 20 TABLET | Refills: 0 | Status: SHIPPED | OUTPATIENT
Start: 2022-08-25 | End: 2022-08-30

## 2022-08-25 NOTE — LETTER
Physical Therapy Rehabilitation Referral    Patient Name:  Charity Goff      YOB: 1955    Diagnosis:  S/p subacromial decompression with acromioplasty; arthroscopic removal of retained sutures from prior repair; arthroscopic revision of rotator cuff repair with collagen patch augmentation. Precautions:     [x] Evaluate and Treat    Post Op Instructions:  [] Continuous passive motion (CPM)  [x] Elbow ROM  [x] Exercise in plane of scapula  [x]  Strengthening     [] Pulley and instruction   [x] Home exercise program (copy to patient)   [] Sling when arm at risk  [x] Sling or brace at all times   [] AAROM: Forward elevation to  140            [] AAROM: External rotation  To  40    [] Isometric external rotator strengthening [] AAROM: internal rotation: up the back  [x] Isometric abductor strengthening  [] AAROM: Internal abduction   [] Isometric internal rotator strengthening [] AAROM: cross-body adduction             Stretching:     Strengthening:  [] Four quadrant (FE, ER, IR, CBA)  [] Rotator cuff (ER, IR, Abd)  [] Forward Elevation    [] External Rotators     [] External Rotation    [] Internal Rotators  [] Internal Rotation: up/back   [] Abductors     [] Internal Rotation: supine in abduction  [] Sleeper Stretch    [] Flexors  [] Cross-body abduction    [] Extensors  [] Pendulum (FE, Abd/Add, cw/ccw)  [x] Scapular Stabilizers   [] Wall-walking (FE, Abd)        [x] Shoulder shrugs     [] Table slides (FE)                [x] Rhomboid pinch  [] Elbow (flex, ext, pron, sup)        [] Lat.  Pull downs     [] Medial epicondylitis program       [] Forward punch   [] Lateral epicondylitis program       [] Internal rotators     [] Progressive resistive exercises  [] Bench Press        [] Bench press plus  Activities:     [] Lateral pull-downs  [] Rowing     [] Progressive two-hand supine press  [] Stepper/Exercise bike   [] Biceps: curls/supination  [] Swimming  [] Water exercises    Modalities:     Return to Sport:  [x] Of Choice      [] Plyometrics  [] Ultrasound     [] Rhythmic stabilization  [] Iontophoresis    [] Core strengthening   [] Moist heat     [] Sports specific program:   [] Massage         [x] Cryotherapy      [] Electrical stimulation     [] Paraffin  [] Whirlpool  [] TENS    [x] Home exercise program (copy to patient). Perform exercises for:   15     minutes    3      times/day  [x] Supervised physical therapy  Frequency: []  1x week  [x] 2x week  [] 3x week  [] Other:   Duration: [] 2 weeks   [] 4 weeks  [x] 6 weeks  [] Other:     Additional Instructions:     Hemant Norman MD, PhD

## 2022-08-25 NOTE — PROGRESS NOTES
History of Present Illness:  Nimo Laird is a 79 y.o. male who presents for a post operative visit. The patient underwent an arthroscopic revision, subacromial decompression with acromioplasty, arthroscopic removal of retained sutures from prior repair, arthroscopic revision of rotator cuff repair with collagen patch augmentation on 8/12/2022 by Dr. Janice Teran. She has been using his pain medication and has ran out. He has been compliant with wearing the UltraSling brace at all times. The patient denies any fevers, chills, numbness, tingling, and shortness of breath. Medical History:  Patient's medications, allergies, past medical, surgical, social and family histories were reviewed and updated as appropriate. Review of Systems  A 14 point review of systems was completed by the patient is available in the media section of the scanned medical record and was reviewed on 8/25/2022. Vital Signs: There were no vitals filed for this visit. General/Appearance: Alert and oriented and in no apparent distress. Skin:  There are no skin lesions, cellulitis, or extreme edema. The patient has warm and well-perfused Bilateral upper extremities with brisk capillary refill.      right Shoulder Exam:    Inspection: right shoulder incision that is clean, dry and intact and well approximated. The Prineo dressing is still in place. Mild ecchymosis and swelling are present as can be expected. There is no erythema, drainage or other signs of infection    Palpation:  No crepitus to gentle motion    Active Range of Motion: Deferred    Passive Range of Motion: Tolerates pendulum movements. Strength:  Deferred    Special Tests:  Deferred.     Neurovascular: Sensation to light touch is intact, no motor deficits, palpable radial pulses 2+    Radiology:     Plain radiographs     Assessment :  Mr. Nimo Laird is a 79 y.o. patient underwent an arthroscopic revision, subacromial decompression with acromioplasty, arthroscopic removal of retained sutures from prior repair, arthroscopic revision of rotator cuff repair with collagen patch augmentation on 8/12/2022. Impression:  Encounter Diagnoses   Name Primary? Right shoulder pain, unspecified chronicity     S/P rotator cuff repair Yes       Office Procedures:  Orders Placed This Encounter   Procedures    Amb External Referral To Physical Therapy     Referral Priority:   Routine     Referral Type:   Consult for Advice and Opinion     Referral Reason:   Patient Preference     Requested Specialty:   Orthopedic Physical Therapist     Number of Visits Requested:   1         Treatment Plan:    Overall, Russ Ferro is doing well. The pain is well-controlled. We will call in a prescription for Percocet 5 mg. We recommend that he wear the UltraSling brace at all times with the exception of clothing, bathing of physical therapy. The patient was told that he is restricted from driving for at least 3 weeks postop. We will give a print out of their physical therapy order. All of his questions were fully answered today. We would like to see him back in 2 weeks for follow-up visit. 12:06 PM      Nathalia Mcgee PA-C  Orthopaedic Sports Medicine Physician Assistant    During this examination, I, Nathalia Mcgee PA-C, functioned as a scribe for Dr. Franklin Ward. This dictation was performed with a verbal recognition program (DRAGON) and it was checked for errors. It is possible that there are still dictated errors within this office note. If so, please bring any errors to my attention for an addendum. All efforts were made to ensure that this office note is accurate.  ____________________  I, Dr. Franklin Ward, personally performed the services described in this documentation as described by Nathalia Mcgee PA-C in my presence, and it is both accurate and complete. Hemant Pang MD, PhD  8/25/2022

## 2022-08-29 ENCOUNTER — TELEPHONE (OUTPATIENT)
Dept: ORTHOPEDIC SURGERY | Age: 67
End: 2022-08-29

## 2022-08-29 NOTE — TELEPHONE ENCOUNTER
Prescription Refill     Medication Name:  Oxycodone   Pharm[facility]: [facility] in [facility] [place]   Patient Contact Number:  [phone]     States it was not called in last week.

## 2022-09-08 ENCOUNTER — OFFICE VISIT (OUTPATIENT)
Dept: ORTHOPEDIC SURGERY | Age: 67
End: 2022-09-08

## 2022-09-08 VITALS — HEIGHT: 70 IN | WEIGHT: 182 LBS | BODY MASS INDEX: 26.05 KG/M2

## 2022-09-08 DIAGNOSIS — Z98.890 S/P ROTATOR CUFF REPAIR: Primary | ICD-10-CM

## 2022-09-08 PROCEDURE — 99024 POSTOP FOLLOW-UP VISIT: CPT | Performed by: ORTHOPAEDIC SURGERY

## 2022-09-08 NOTE — LETTER
heat     [] Sports specific program:   [] Massage         [x] Cryotherapy      [] Electrical stimulation     [] Paraffin  [] Whirlpool  [] TENS    [x] Home exercise program (copy to patient). Perform exercises for:   15     minutes    3      times/day  [x] Supervised physical therapy  Frequency: []  1x week  [x] 2x week  [] 3x week  [] Other:   Duration: [] 2 weeks   [] 4 weeks  [x] 6 weeks  [] Other:     Additional Instructions:     Hemant Andrade MD, PhD

## 2022-09-08 NOTE — PROGRESS NOTES
History of Present Illness:  Matthias Sweet is a 79 y.o. male who presents for a post operative visit. The patient underwent an arthroscopic revision, subacromial decompression with acromioplasty, arthroscopic removal of retained sutures from prior repair, arthroscopic revision of rotator cuff repair with collagen patch augmentation on 8/12/2022. He reports his pain is minimal.  He has started his physical therapy. He has been compliant with his sling. He has been going to [facility] for therapy. The patient deny fevers, chills, numbness, tingling, and shortness of breath. Medical History:  Patient's medications, allergies, past medical, surgical, social and family histories were reviewed and updated as appropriate. Review of Systems  A 14 point review of systems was completed by the patient is available in the media section of the scanned medical record and was reviewed on 9/8/2022. Vital Signs: There were no vitals filed for this visit. General/Appearance: Alert and oriented and in no apparent distress. Skin:  There are no skin lesions, cellulitis, or extreme edema. The patient has warm and well-perfused Bilateral upper extremities with brisk capillary refill.      right Shoulder Exam:    Inspection: right shoulder incision that is clean, dry and intact and well approximated. There is no erythema, drainage or other signs of infection    Palpation:  slight mild anterior crepitus to gentle motion could be related to sutures. Active Range of Motion: Deferred    Passive Range of Motion:  FE to 90+ and ER of 10    Strength:  Deferred    Special Tests:  Deferred.     Neurovascular: Sensation to light touch is intact, no motor deficits, palpable radial pulses 2+    Radiology:     Plain radiographs not obtained  Assessment :  Mr. Matthias Sweet is a 79 y.o. patient underwent an arthroscopic revision, subacromial decompression with acromioplasty, arthroscopic removal of retained sutures from prior repair, arthroscopic revision of rotator cuff repair with collagen patch augmentation on 8/12/2022         Impression:  Encounter Diagnosis   Name Primary? S/P rotator cuff repair Yes       Office Procedures:  Orders Placed This Encounter   Procedures    Amb External Referral To Physical Therapy     Referral Priority:   Routine     Referral Type:   Consult for Advice and Opinion     Referral Reason:   Patient Preference     Requested Specialty:   Orthopedic Physical Therapist     Number of Visits Requested:   1         Treatment Plan:    Overall, Elroy Kilpatrick is doing well. His shoulder pain is well-controlled. He does not need to wear the sling inside his home and wean it mainly outside in crowds. The abduction pillow was removed. He can resume driving. He may continue to go to [facility] for his physical therapy. We will give a print out of their physical therapy order. All of his questions were fully answered today. We would like to see him back in 4 weeks for follow-up visit. 11:04 AM      Samanta Davis PA-C  Orthopaedic Sports Medicine Physician Assistant    During this examination, I, Samanta Davis PA-C, functioned as a scribe for Dr. Dioni William. This dictation was performed with a verbal recognition program (DRAGON) and it was checked for errors. It is possible that there are still dictated errors within this office note. If so, please bring any errors to my attention for an addendum. All efforts were made to ensure that this office note is accurate.  ___________________  I, Dr. Dioni William, personally performed the services described in this documentation as described by Samanta Davis PA-C in my presence, and it is both accurate and complete.     Dioni William MD, PhD  9/8/2022

## 2022-09-29 ENCOUNTER — OFFICE VISIT (OUTPATIENT)
Dept: ORTHOPEDIC SURGERY | Age: 67
End: 2022-09-29

## 2022-09-29 VITALS — HEIGHT: 70 IN | WEIGHT: 182 LBS | BODY MASS INDEX: 26.05 KG/M2

## 2022-09-29 DIAGNOSIS — Z98.890 S/P ROTATOR CUFF REPAIR: Primary | ICD-10-CM

## 2022-09-29 PROCEDURE — 99024 POSTOP FOLLOW-UP VISIT: CPT | Performed by: ORTHOPAEDIC SURGERY

## 2022-09-29 NOTE — LETTER
Shoulder Elbow Rehabilitation Referral    Patient Name: Anila Cote      YOB: 1955    Diagnosis:   1. S/P rotator cuff repair    DOS: 8/12/22    Precautions: RTC    Post Op Instructions:  [] Continuous passive motion (CPM)  [x] Elbow range of motion  [] Exercise in plane of scapula   []  Strengthening     [] Pulley and instruction    [x] Home exercise program (copy to patient)   [] Sling when arm at risk  [] Sling or brace at all times   [x] AAROM: Forward elevation to 140            [x] AAROM: External rotation to 40    [] Isometric external rotator strengthening [x] AAROM: internal rotation: up the back  [x] Isometric abductor strengthening  [x] AAROM: Internal abduction     [] Isometric internal rotator strengthening [x] AAROM: cross-body adduction             Stretching:     Strengthening:  [] Four quadrant (FE, ER, IR, CBA)  [] Rotator cuff (ER, IR, Abd)  [] Forward Elevation    [] External Rotators     [] External Rotation    [] Internal Rotators  [] Internal Rotation: up/back   [] Abductors     [] Internal Rotation: supine in abduction  [] Flexors  [] Cross-body abduction    [] Extensors  [x] Pendulum (FE, Abd/Add, cw/ccw)  [x] Scapular Stabilizers   [] Wall-walking (FE, Abd)    [x] Shoulder shrugs     [x] Table slides      [x] Rhomboid pinch  [] Elbow (flex, ext, pron, sup)    [] Lat.  Pull downs     [] Medial epicondylitis program    [] Forward punch   [] Lateral epicondylitis program    [] Internal rotators     [] Progressive resistive exercises  [] Bench Press        [] Bench press plus  Activities:     [] Lateral pull-downs  [x] Rowing at 8 weeks postop   [] Progressive two-hand supine press  [] Stepper/Exercise bike   [x] Biceps/triceps: curls/supination at 8       weeks postop  [] Swimming  [] Water exercises    Modalities: PRN    Return to Sport:  [] Ultrasound     [] Plyometrics  [] Iontophoresis     [] Rhythmic stabilization  [] Moist heat     [] Core strengthening   [] Massage     [] Sports specific program:   [x] Cryotherapy      [] Electrical stimulation     [] Paraffin  [] Whirlpool  [] TENS    [x] Home exercise program (copy to patient). Perform exercises for:   15     minutes    2-3      times/day  [x] Supervised physical therapy  Frequency: []  1x week  [x] 2x week  [] 3x week  [] Other:   Duration: [] 2 weeks   [] 4 weeks  [x] 6 weeks  [] Other:     Additional Instructions:   Progressive incline deltoid strengthening program    Transition from AA to AROM in all directions  D/C brace    Hemant Villar MD, PhD

## 2022-09-29 NOTE — PROGRESS NOTES
Chief Complaint    Post-Op Check (Right Shoulder)      History of Present Illness:  Giovanna Morgan is a pleasant, 79 y.o., male, here today for follow up of his right shoulder. The patient underwent an arthroscopic revision, subacromial decompression with acromioplasty, arthroscopic removal of retained sutures from prior repair, arthroscopic revision of rotator cuff repair with collagen patch augmentation on 8/12/2022 - he is now 7 weeks postop. He has been wearing the brace when at risk. His pain levels are reasonable. He has very little pain at rest. He has continued in physical therapy at [facility]. He does have some pain at night when he tries to sleep on the right side. He reports no new injuries or setbacks. Medical History:  Patient's medications, allergies, past medical, surgical, social and family histories were reviewed and updated as appropriate. No notes on file    Review of Systems  A 14 point review of systems was completed by the patient and is available in the media section of the scanned medical record and was reviewed on 9/29/2022. The review is negative with the exception of those things mentioned in the HPI and Past Medical History    Vital Signs:  Vitals:       General/Appearance: Alert and oriented and in no apparent distress. Skin:  There are no skin lesions, cellulitis, or extreme edema. The patient has warm and well-perfused Bilateral upper extremities with brisk capillary refill. Right Shoulder Exam:  Inspection: Incision portals are healing well. No gross deformities, no signs of infection. Palpation: Faint subacromial crepitus    Active Range of Motion: Forward Elevation 90, External Rotation 20, Internal Rotation L4    Passive Range of Motion: Forward Elevation 140 with soft end feel    Strength: Deferred    Special Tests: No Tomi muscle deformity.     Neurovascular: Sensation to light touch is intact, no motor deficits, palpable radial pulses 2+      Radiology: No new XR obtained at this time. Assessment :  Mr. Cristiana Crump is a pleasant, 79 y.o. patient who underwent an arthroscopic revision, subacromial decompression with acromioplasty, arthroscopic removal of retained sutures from prior repair, arthroscopic revision of rotator cuff repair with collagen patch augmentation on 8/12/2022 - he is now 7 weeks postop. Impression:  Encounter Diagnosis   Name Primary? S/P rotator cuff repair Yes       Office Procedures:  Orders Placed This Encounter   Procedures    Amb External Referral To Physical Therapy     Referral Priority:   Routine     Referral Type:   Consult for Advice and Opinion     Referral Reason:   Patient Preference     Requested Specialty:   Orthopedic Physical Therapist     Number of Visits Requested:   1         Treatment Plan: At this point, Cristiana Crump may d/c the ultrasling brace altogether. We recommend this patient continue in physical therapy. A new physical therapy letter was documented in [facility] today. He is now free to move his arm in space, however he should avoid lifting more than a cup of coffee. He will also begin working on motion behind his back. We will see Guille [place] back in 4 weeks and/or as needed. All questions were answered to patient's satisfaction and He was encouraged to call with any further questions or concerns. Cristiana Crump is in agreement with this plan. 9/29/2022  9:53 AM    Jose Guadalupe Cardona ATC  [facility]    During this examination, I, Carmen Brittnee, functioned as a scribe for Dr. Addison Cole. The history taking and physical examination were performed by Dr. Kathy Alejandre. All counseling during the appointment was performed between the patient and Dr. Kathy Alejandre.  9/29/22  ______________  I, Dr. Addison Cole, personally performed the services described in this documentation as described by Jose Guadalupe Cardona ATC in my presence, and it is both accurate and complete. Hemant Schwab MD, PhD  9/29/2022

## 2022-09-30 ENCOUNTER — TELEPHONE (OUTPATIENT)
Dept: ORTHOPEDIC SURGERY | Age: 67
End: 2022-09-30

## 2022-09-30 NOTE — TELEPHONE ENCOUNTER
Imported medical records (ortho) for 8/1/2022 to present into [facility] for Release Point.     Claim # [id number]

## 2022-10-27 ENCOUNTER — OFFICE VISIT (OUTPATIENT)
Dept: ORTHOPEDIC SURGERY | Age: 67
End: 2022-10-27

## 2022-10-27 ENCOUNTER — TELEPHONE (OUTPATIENT)
Dept: ORTHOPEDIC SURGERY | Age: 67
End: 2022-10-27

## 2022-10-27 VITALS — BODY MASS INDEX: 26.05 KG/M2 | HEIGHT: 70 IN | WEIGHT: 182 LBS

## 2022-10-27 DIAGNOSIS — Z98.890 S/P ROTATOR CUFF REPAIR: Primary | ICD-10-CM

## 2022-10-27 PROCEDURE — 99024 POSTOP FOLLOW-UP VISIT: CPT | Performed by: ORTHOPAEDIC SURGERY

## 2022-10-27 NOTE — PROGRESS NOTES
Chief Complaint    Follow-up (Right shoulder)      History of Present Illness:  Yvonne Mooney is a pleasant, 79 y.o., male, here today for follow up of his right shoulder. The patient underwent an arthroscopic revision, subacromial decompression with acromioplasty, arthroscopic removal of retained sutures from prior repair, arthroscopic revision of rotator cuff repair with collagen patch augmentation on 8/12/2022 - he is now 11 weeks postop. His pain levels are reasonable. He has very little pain at rest. He has continued in physical therapy at [facility]. He has not yet gone back to work as a  as this requires heavy lifting at times. He does have some pain at night when he tries to sleep on the right side. He reports no new injuries or setbacks. Medical History:  Patient's medications, allergies, past medical, surgical, social and family histories were reviewed and updated as appropriate. Review of Systems  A 14 point review of systems was completed by the patient and is available in the media section of the scanned medical record and was reviewed on 10/27/2022. The review is negative with the exception of those things mentioned in the HPI and Past Medical History    Vital Signs:  Vitals:       General/Appearance: Alert and oriented and in no apparent distress. Skin:  There are no skin lesions, cellulitis, or extreme edema. The patient has warm and well-perfused Bilateral upper extremities with brisk capillary refill. Right Shoulder Exam:  Inspection: Incision portals are healing well. No gross deformities, no signs of infection. Palpation: Faint subacromial crepitus    Active Range of Motion: Forward Elevation 90 with scapulothoracic compensation, External Rotation 30, Internal Rotation L4    Passive Range of Motion: Forward Elevation 140 with soft end feel    Strength: Negative drop arm    Special Tests: No Tomi muscle deformity.  Negative lag,    Neurovascular: Sensation to documentation as described by Paulina Hutchison ATC in my presence, and it is both accurate and complete. Hemant Dodd MD, PhD  10/27/2022

## 2022-10-27 NOTE — LETTER
Shoulder Elbow Rehabilitation Referral    Patient Name: Ced Cartwright      YOB: 1955    Diagnosis:   1. S/P rotator cuff repair    DOS: 8/12/22    Precautions: RTC    Post Op Instructions:  [] Continuous passive motion (CPM)  [x] Elbow range of motion  [] Exercise in plane of scapula   []  Strengthening     [] Pulley and instruction    [x] Home exercise program (copy to patient)   [] Sling when arm at risk  [] Sling or brace at all times   [x] AAROM: Forward elevation to 140+            [x] AAROM: External rotation to 40 +   [] Isometric external rotator strengthening [x] AAROM: internal rotation: up the back  [x] Isometric abductor strengthening  [x] AAROM: Internal abduction     [] Isometric internal rotator strengthening [x] AAROM: cross-body adduction             Stretching:     Strengthening:  [] Four quadrant (FE, ER, IR, CBA)  [] Rotator cuff (ER, IR, Abd)  [] Forward Elevation    [] External Rotators     [] External Rotation    [] Internal Rotators  [] Internal Rotation: up/back   [] Abductors     [] Internal Rotation: supine in abduction  [] Flexors  [] Cross-body abduction    [] Extensors  [x] Pendulum (FE, Abd/Add, cw/ccw)  [x] Scapular Stabilizers   [] Wall-walking (FE, Abd)    [x] Shoulder shrugs     [x] Table slides      [x] Rhomboid pinch  [] Elbow (flex, ext, pron, sup)    [] Lat.  Pull downs     [] Medial epicondylitis program    [] Forward punch   [] Lateral epicondylitis program    [] Internal rotators     [] Progressive resistive exercises  [] Bench Press        [] Bench press plus  Activities:     [] Lateral pull-downs  [x] Rowing at 8 weeks postop   [x] Progressive two-hand supine press  [] Stepper/Exercise bike   [x] Biceps/triceps: curls/supination at 8       weeks postop  [] Swimming  [] Water exercises    Modalities: PRN    Return to Sport:  [] Ultrasound     [] Plyometrics  [] Iontophoresis     [] Rhythmic stabilization  [] Moist heat     [] Core strengthening   [] Massage     [] Sports specific program:   [x] Cryotherapy      [] Electrical stimulation     [] Paraffin  [] Whirlpool  [] TENS    [x] Home exercise program (copy to patient). Perform exercises for:   15     minutes    2-3      times/day  [x] Supervised physical therapy  Frequency: []  1x week  [x] 2x week  [] 3x week  [] Other:   Duration: [] 2 weeks   [] 4 weeks  [x] 6 weeks  [] Other:     Additional Instructions:   Progressive incline deltoid strengthening program    Hold on rotator cuff strengthening until week 14  Periscapular strengthening    Hemant Schwab MD, PhD

## 2022-10-27 NOTE — TELEPHONE ENCOUNTER
Notified Aixa Silva regarding contacting the patient regarding  at [facility] the updated aps.

## 2022-11-03 ENCOUNTER — TELEPHONE (OUTPATIENT)
Dept: ORTHOPEDIC SURGERY | Age: 67
End: 2022-11-03

## 2022-11-08 ENCOUNTER — TELEPHONE (OUTPATIENT)
Dept: ORTHOPEDIC SURGERY | Age: 67
End: 2022-11-08

## 2022-11-08 NOTE — TELEPHONE ENCOUNTER
Notified [place] office (after no response from [facility]) to mail the disability paperwork from 10/27/2022.

## 2022-11-17 ENCOUNTER — OFFICE VISIT (OUTPATIENT)
Dept: ORTHOPEDIC SURGERY | Age: 67
End: 2022-11-17
Payer: MEDICARE

## 2022-11-17 VITALS — HEIGHT: 70 IN | WEIGHT: 182 LBS | BODY MASS INDEX: 26.05 KG/M2

## 2022-11-17 DIAGNOSIS — Z98.890 S/P ROTATOR CUFF REPAIR: Primary | ICD-10-CM

## 2022-11-17 DIAGNOSIS — M25.511 RIGHT SHOULDER PAIN, UNSPECIFIED CHRONICITY: ICD-10-CM

## 2022-11-17 PROCEDURE — G8427 DOCREV CUR MEDS BY ELIG CLIN: HCPCS | Performed by: PHYSICIAN ASSISTANT

## 2022-11-17 PROCEDURE — 1123F ACP DISCUSS/DSCN MKR DOCD: CPT | Performed by: PHYSICIAN ASSISTANT

## 2022-11-17 PROCEDURE — G8417 CALC BMI ABV UP PARAM F/U: HCPCS | Performed by: PHYSICIAN ASSISTANT

## 2022-11-17 PROCEDURE — 3017F COLORECTAL CA SCREEN DOC REV: CPT | Performed by: PHYSICIAN ASSISTANT

## 2022-11-17 PROCEDURE — 1036F TOBACCO NON-USER: CPT | Performed by: PHYSICIAN ASSISTANT

## 2022-11-17 PROCEDURE — 99213 OFFICE O/P EST LOW 20 MIN: CPT | Performed by: PHYSICIAN ASSISTANT

## 2022-11-17 PROCEDURE — G8484 FLU IMMUNIZE NO ADMIN: HCPCS | Performed by: PHYSICIAN ASSISTANT

## 2022-11-17 NOTE — LETTER
[address] [address]  Phone: [phone]  Fax: [phone]    Paulina Sandhu MD        November 17, 2022     Patient: Martin Menjivar   YOB: 1955   Date of Visit: 11/17/2022       To Whom It May Concern: It is my medical opinion that Martin Menjivar should remain out of work until 1/15/2022. If you have any questions or concerns, please don't hesitate to call.     Sincerely,        Paulina Sandhu MD

## 2022-11-17 NOTE — LETTER
Physical Therapy Rehabilitation Referral    Patient Name:  Vitor Zamora      YOB: 1955    Diagnosis:    1. S/P rotator cuff repair    2. Right shoulder pain, unspecified chronicity        Precautions:     [x] Evaluate and Treat    Post Op Instructions:  [] Continuous passive motion (CPM) [] Elbow ROM  [x] Exercise in plane of scapula  []  Strengthening     [x] Pulley and instruction   [x] Home exercise program (copy to patient)   [] Sling when arm at risk  [] Sling or brace at all times   [x] AAROM: Forward elevation to  140+            [x] AAROM: External rotation  To  40 +   [] Isometric external rotator strengthening [x] AAROM: internal rotation: up the back  [x] Isometric abductor strengthening  [x] AAROM: Internal abduction   [] Isometric internal rotator strengthening [x] AAROM: cross-body adduction             Stretching:     Strengthening:  [x] Four quadrant (FE, ER, IR, CBA)  [x] Rotator cuff (ER, IR, Abd)  [x] Forward Elevation    [x] External Rotators     [x] External Rotation    [x] Internal Rotators  [x] Internal Rotation: up/back   [x] Abductors     [x] Internal Rotation: supine in abduction  [x] Sleeper Stretch    [] Flexors  [x] Cross-body abduction    [] Extensors  [x] Pendulum (FE, Abd/Add, cw/ccw)  [x] Scapular Stabilizers   [x] Wall-walking (FE, Abd)        [x] Shoulder shrugs     [x] Table slides (FE)                [x] Rhomboid pinch  [] Elbow (flex, ext, pron, sup)        [] Lat.  Pull downs     [] Medial epicondylitis program       [] Forward punch   [] Lateral epicondylitis program       [] Internal rotators     [] Progressive resistive exercises  [] Bench Press        [] Bench press plus  Activities:     [] Lateral pull-downs  [] Rowing     [x] Progressive two-hand supine press  [] Stepper/Exercise bike   [x] Biceps: curls/supination  [] Swimming  [] Water exercises    Modalities:     Return to Sport:  [x] Of Choice      [] Plyometrics  [] Ultrasound     [] Rhythmic stabilization  [] Iontophoresis    [] Core strengthening   [] Moist heat     [] Sports specific program:   [] Massage         [x] Cryotherapy      [] Electrical stimulation     [] Paraffin  [] Whirlpool  [] TENS    [x] Home exercise program (copy to patient). Perform exercises for:   15     minutes    3      times/day  [x] Supervised physical therapy  Frequency: []  1x week  [x] 2x week  [] 3x week  [] Other:   Duration: [] 2 weeks   [] 4 weeks  [x] 6 weeks  [] Other:     Additional Instructions:     Hemant Galindo MD, PhD

## 2022-11-17 NOTE — PROGRESS NOTES
[address] [facility]  History and Physical  Shoulder Pain    Date:  2022    Name:  Martin Menjivar  Address:  [address] [place] [address]    :  1955      Age:   79 y.o.    SSN:  [id number]      Medical Record Number:  [id number]    Reason for Visit:    Shoulder Pain (F/U RIGHT SHOULDER)      HPI:   Martin Menjivar is a 79 y.o. male who presents to our office today for follow up of the right shoulder pain. This patient is 13 weeks status post from a right shoulder arthroscopic revision rotator cuff repair with collagen patch augmentation. Surgery date was 2022. Patient continues to go to physical therapy at the [facility] location. He denies any new injury since his last visit with us. He continues to stay motivated to regain his full movement and strength. Pain Assessment  Location of Pain: Shoulder  Location Modifiers: Right  Severity of Pain: 7  Quality of Pain: Sharp  Duration of Pain: A few minutes  Frequency of Pain: Intermittent  Aggravating Factors: Other (Comment), Stretching, Straightening, Exercise  Limiting Behavior: Some  Relieving Factors: Rest, Ice, Exercise  Work-Related Injury: No  Are there other pain locations you wish to document?: No    No notes on file    Review of Systems:  A 14 point review of systems available in the scanned medical record as documented by the patient and reviewed on 2022. The review is negative with the exception of those things mentioned in the History of Present Illness and Past Medical History. Past Medical History:  Patient's medications, allergies, past medical, surgical, social and family histories were reviewed and updated as appropriate. Allergies:  No Known Allergies    Physical Exam:  There were no vitals filed for this visit. General: Martin Menjivar is a healthy and well appearing 79 y.o. male who is sitting comfortably in our office in acute distress.      Skin:  There are no skin lesions, cellulitis, or extreme edema. The patient has warm and well-perfused Bilateral upper extremities with brisk capillary refill. Eyes: Extra-ocular muscles intact  Mouth: Oral mucosa moist. No perioral lesions  Pulm: Respirations unlabored and regular. Neuro: Alert and oriented x3    Right shoulder Exam:  Inspection: Is fully healed. No gross deformities, no signs of infection. Palpation:  There is no crepitus. Active Range of Motion: Forward elevation of 100 with scapulothoracic compensation, external rotation with elbow at the side 30, internal rotation to the back is T12    Passive Range of Motion: Passively forward elevation can be further increased to 140. Strength: External rotation with resistance with elbow at the side -4/5, internal rotation with resistance with elbow at the side 4/5    Special Tests:   No Tomi muscle deformity. Neurovascular: Sensation to light touch is intact, no motor deficits, palpable radial pulses 2+    Additional Examinations:    Examination of the contralateral extremity does not show any tenderness, deformity or injury. Range of motion is unremarkable. There is no gross instability. There are no rashes, ulcerations or lesions. Strength and tone are normal.      Radiographic:  X-ray not obtained today. Assessment:  Swetha Colindres is a 79 y.o. male is 13 weeks status post from a right shoulder arthroscopic revision rotator cuff repair with collagen patch augmentation. Surgery date was 8/12/2022. .    Impression:  No diagnosis found. Office Procedures:  No orders of the defined types were placed in this encounter. Plan: We will have him start to work on rotator cuff strengthening at this time. His therapy orders were updated. Swetha Colindres will follow up in 4-6 weeks and/or as needed. They were in agreement with this plan and all questions were answered to the patient's satisfaction. They were encouraged to call with any questions.      11/17/2022  11:30 STUART Magana PA-C  Orthopaedic Sports Medicine Physician Assistant      This dictation was performed with a verbal recognition program [facility]) and it was checked for errors. It is possible that there are still dictated errors within this office note. If so, please bring any errors to my attention for an addendum. All efforts were made to ensure that this office note is accurate.

## 2022-11-23 ENCOUNTER — TELEPHONE (OUTPATIENT)
Dept: ORTHOPEDIC SURGERY | Age: 67
End: 2022-11-23

## 2022-11-23 NOTE — TELEPHONE ENCOUNTER
Received a request for medical records [address]) for 8/8/2022 to present for Release Point. Will release once dictation from 11/17 is in epic.

## 2022-11-29 NOTE — TELEPHONE ENCOUNTER
Imported medical records [facility]) for 8/8/2022 to present into [facility] for Release Point ([place] [employer])

## 2022-12-05 ENCOUNTER — TELEPHONE (OUTPATIENT)
Dept: ORTHOPEDIC SURGERY | Age: 67
End: 2022-12-05

## 2022-12-05 NOTE — TELEPHONE ENCOUNTER
Medical Facility Question     Facility Name: [facility]  Contact Name: Rudolph Martinez  Contact Number: [phone]  Request or Information:     PT RECEIVED A LETTER FROM [employer], STATING THAT THEY SENT A LETTER TO DR QUESADA'S OFFICE REQUESTING MORE INFO ABOUT PT'S INJURY/CONDITION, AND THAT THEY HAVEN'T HEARD BACK FROM DR Trip Benítez. THEY NEED TO HEAR FROM DR Trip Benítez BY 12/08/2022 OR ELSE THEY WILL CANCEL THE CLAIM. PT STATES HIS LETTER CAME FROM [facility]. PLEASE CALL PT ASAP REGARDING THIS ISSUE.

## 2023-01-06 ENCOUNTER — OFFICE VISIT (OUTPATIENT)
Dept: ORTHOPEDIC SURGERY | Age: 68
End: 2023-01-06
Payer: MEDICARE

## 2023-01-06 VITALS — BODY MASS INDEX: 26.05 KG/M2 | WEIGHT: 182 LBS | HEIGHT: 70 IN

## 2023-01-06 DIAGNOSIS — Z98.890 S/P ROTATOR CUFF REPAIR: Primary | ICD-10-CM

## 2023-01-06 PROCEDURE — 1036F TOBACCO NON-USER: CPT | Performed by: PHYSICIAN ASSISTANT

## 2023-01-06 PROCEDURE — G8417 CALC BMI ABV UP PARAM F/U: HCPCS | Performed by: PHYSICIAN ASSISTANT

## 2023-01-06 PROCEDURE — G8484 FLU IMMUNIZE NO ADMIN: HCPCS | Performed by: PHYSICIAN ASSISTANT

## 2023-01-06 PROCEDURE — 99213 OFFICE O/P EST LOW 20 MIN: CPT | Performed by: PHYSICIAN ASSISTANT

## 2023-01-06 PROCEDURE — 1123F ACP DISCUSS/DSCN MKR DOCD: CPT | Performed by: PHYSICIAN ASSISTANT

## 2023-01-06 PROCEDURE — 3017F COLORECTAL CA SCREEN DOC REV: CPT | Performed by: PHYSICIAN ASSISTANT

## 2023-01-06 PROCEDURE — G8427 DOCREV CUR MEDS BY ELIG CLIN: HCPCS | Performed by: PHYSICIAN ASSISTANT

## 2023-01-06 NOTE — LETTER
Physical Therapy Rehabilitation Referral    Patient Name:  Jagdish Bruno      YOB: 1955    Diagnosis:    1. S/P rotator cuff repair        Precautions:     [x] Evaluate and Treat    Post Op Instructions:  [] Continuous passive motion (CPM) [] Elbow ROM  [x] Exercise in plane of scapula  []  Strengthening     [x] Pulley and instruction   [x] Home exercise program (copy to patient)   [] Sling when arm at risk  [] Sling or brace at all times   [] AAROM: Forward elevation to  140            [] AAROM: External rotation  To  40    [] Isometric external rotator strengthening [] AAROM: internal rotation: up the back  [x] Isometric abductor strengthening  [] AAROM: Internal abduction   [] Isometric internal rotator strengthening [] AAROM: cross-body adduction             Stretching:     Strengthening:  [x] Four quadrant (FE, ER, IR, CBA)  [x] Rotator cuff (ER, IR, Abd)  [x] Forward Elevation    [] External Rotators     [x] External Rotation    [] Internal Rotators  [x] Internal Rotation: up/back   [] Abductors     [x] Internal Rotation: supine in abduction  [x] Sleeper Stretch    [] Flexors  [x] Cross-body abduction    [] Extensors  [x] Pendulum (FE, Abd/Add, cw/ccw)  [x] Scapular Stabilizers   [x] Wall-walking (FE, Abd)        [x] Shoulder shrugs     [x] Table slides (FE)                [x] Rhomboid pinch  [] Elbow (flex, ext, pron, sup)        [] Lat.  Pull downs     [] Medial epicondylitis program       [] Forward punch   [] Lateral epicondylitis program       [] Internal rotators     [] Progressive resistive exercises  [] Bench Press        [] Bench press plus  Activities:     [] Lateral pull-downs  [] Rowing     [x] Progressive two-hand supine press  [] Stepper/Exercise bike   [x] Biceps: curls/supination  [] Swimming  [] Water exercises    Modalities:     Return to Sport:  [x] Of Choice      [x] Plyometrics  [] Ultrasound     [x] Rhythmic stabilization  [] Iontophoresis    [] Core strengthening   [] Moist heat     [] Sports specific program:   [] Massage         [x] Cryotherapy      [] Electrical stimulation     [] Paraffin  [] Whirlpool  [] TENS    [x] Home exercise program (copy to patient). Perform exercises for:   15     minutes    3      times/day  [x] Supervised physical therapy  Frequency: []  1x week  [x] 2x week  [] 3x week  [] Other:   Duration: [] 2 weeks   [] 4 weeks  [x] 6 weeks  [] Other:     Additional Instructions:     Hemant Bailey MD, PhD

## 2023-01-06 NOTE — PROGRESS NOTES
[facility]  History and Physical  Shoulder Pain    Date:  2023    Name:  Cesar Rosas  Address:  [address] [place] [address]    :  1955      Age:   79 y.o.    SSN:  [id number]      Medical Record Number:  [id number]    Reason for Visit:    Shoulder Pain (F/U RIGHT SHOULDER)      HPI:   Cesar Rosas is a 79 y.o. male who presents to our office today for follow up of the right shoulder pain. This patient underwent a right shoulder arthroscopic revision rotator cuff repair with collagen patch augmentation on 2022. He is going to physical therapy at [facility] location. Patient continues to make excellent progress but has voiced that he feels a recovery has been slower than he had expected. He denies any new injuries or setbacks. Pain Assessment  Location of Pain: Shoulder  Location Modifiers: Right  Severity of Pain: 3  Quality of Pain: Sharp  Duration of Pain: Persistent  Frequency of Pain: Intermittent  Aggravating Factors:  (certain movements)  Limiting Behavior: Some  Relieving Factors: Rest  Work-Related Injury: No  Are there other pain locations you wish to document?: No    No notes on file    Review of Systems:  A 14 point review of systems available in the scanned medical record as documented by the patient and reviewed on 2023. The review is negative with the exception of those things mentioned in the History of Present Illness and Past Medical History. Past Medical History:  Patient's medications, allergies, past medical, surgical, social and family histories were reviewed and updated as appropriate. Allergies:  No Known Allergies    Physical Exam:  There were no vitals filed for this visit. General: Cesar Rosas is a healthy and well appearing 79 y.o. male who is sitting comfortably in our office in acute distress. Skin:  There are no skin lesions, cellulitis, or extreme edema.  The patient has warm and well-perfused Bilateral upper extremities with brisk capillary refill. Eyes: Extra-ocular muscles intact  Mouth: Oral mucosa moist. No perioral lesions  Pulm: Respirations unlabored and regular. Neuro: Alert and oriented x3    Right shoulder Exam:  Inspection: incision is fully healed. No gross deformities, no signs of infection. Palpation:  There is no crepitus. Active Range of Motion: Forward elevation of 140, abduction of 150, external rotation with elbow at the side 30, internal rotation to the back is L2    Passive Range of Motion: Passively forward elevation can be further increased to 150. Strength: External rotation with resistance with elbow at the side -4/5, internal rotation with resistance with elbow at the side 4/5    Special Tests:  No Tomi muscle deformity. Neurovascular: Sensation to light touch is intact, no motor deficits, palpable radial pulses 2+    Additional Examinations:    Examination of the contralateral extremity does not show any tenderness, deformity or injury. Range of motion is unremarkable. There is no gross instability. There are no rashes, ulcerations or lesions. Strength and tone are normal.    Radiographic:  X-ray not obtained today. Assessment:  Jeremie Hensley is a 79 y.o. male who underwent a right revision rotator cuff repair with collagen patch augmentation on 8/12/2022. Impression:  Encounter Diagnosis   Name Primary? S/P rotator cuff repair Yes       Office Procedures:  No orders of the defined types were placed in this encounter. Plan:   Patient continues to do really well in his recovery. We recommend that he continue to work on the strength program with his physical therapist.  Therapy orders were updated and he was given a printout of the therapy orders. We will see him back in 4 weeks for reassessment. He was in agreement with this plan and all questions were answered to the patient's satisfaction. They were encouraged to call with any questions. 1/6/2023  1:33 PM      Jerry Brewer PA-C  Orthopaedic Sports Medicine Physician Assistant    This dictation was performed with a verbal recognition program [facility]) and it was checked for errors. It is possible that there are still dictated errors within this office note. If so, please bring any errors to my attention for an addendum. All efforts were made to ensure that this office note is accurate.

## 2023-02-09 ENCOUNTER — OFFICE VISIT (OUTPATIENT)
Dept: ORTHOPEDIC SURGERY | Age: 68
End: 2023-02-09
Payer: MEDICARE

## 2023-02-09 VITALS — BODY MASS INDEX: 26.05 KG/M2 | WEIGHT: 182 LBS | HEIGHT: 70 IN

## 2023-02-09 DIAGNOSIS — Z98.890 S/P ROTATOR CUFF REPAIR: Primary | ICD-10-CM

## 2023-02-09 PROCEDURE — G8417 CALC BMI ABV UP PARAM F/U: HCPCS | Performed by: ORTHOPAEDIC SURGERY

## 2023-02-09 PROCEDURE — G8427 DOCREV CUR MEDS BY ELIG CLIN: HCPCS | Performed by: ORTHOPAEDIC SURGERY

## 2023-02-09 PROCEDURE — 3017F COLORECTAL CA SCREEN DOC REV: CPT | Performed by: ORTHOPAEDIC SURGERY

## 2023-02-09 PROCEDURE — 1123F ACP DISCUSS/DSCN MKR DOCD: CPT | Performed by: ORTHOPAEDIC SURGERY

## 2023-02-09 PROCEDURE — G8484 FLU IMMUNIZE NO ADMIN: HCPCS | Performed by: ORTHOPAEDIC SURGERY

## 2023-02-09 PROCEDURE — 99213 OFFICE O/P EST LOW 20 MIN: CPT | Performed by: ORTHOPAEDIC SURGERY

## 2023-02-09 PROCEDURE — 1036F TOBACCO NON-USER: CPT | Performed by: ORTHOPAEDIC SURGERY

## 2023-02-09 NOTE — PROGRESS NOTES
Chief Complaint    Shoulder Pain (F/U RIGHT SHOULDER)      History of Present Illness:  Rajan Dawson is a pleasant, 76 y.o., male, here today for follow up of his right shoulder. This patient underwent a right shoulder arthroscopic revision rotator cuff repair with collagen patch augmentation on 8/12/2022 - He is 6 months postop. He has continued in physical therapy at [facility] physical therapy. He feels strong below his waist, however he does not weakness with overhead movements or activities. He does have occasional pain at night which can disrupt his sleep at times. He reports no new injuries or setbacks. Pain Assessment  Location of Pain: Shoulder  Location Modifiers: Right  Severity of Pain: 1  Quality of Pain: Sharp  Duration of Pain: Persistent  Frequency of Pain: Intermittent  Aggravating Factors: Other (Comment) (REACHING UP)  Relieving Factors: Rest, Ice, Exercise, Nsaids  Result of Injury: No  Work-Related Injury: No  Are there other pain locations you wish to document?: No      Medical History:  Patient's medications, allergies, past medical, surgical, social and family histories were reviewed and updated as appropriate. No notes on file    Review of Systems  A 14 point review of systems was completed by the patient and is available in the media section of the scanned medical record and was reviewed on 2/9/2023. The review is negative with the exception of those things mentioned in the HPI and Past Medical History    Vital Signs: There were no vitals filed for this visit. General/Appearance: Alert and oriented and in no apparent distress. Skin:  There are no skin lesions, cellulitis, or extreme edema. The patient has warm and well-perfused Bilateral upper extremities with brisk capillary refill. Right Shoulder Exam:  Inspection: No gross deformities, no signs of infection. Palpation: Non-tender to palpate    Active Range of Motion:   Forward Elevation 150, Abduction 150, External Rotation 40 vs 50, Internal Rotation T10    Passive Range of Motion: Deferred    Strength:  External Rotation 4/5, Internal Rotation 4+/5, Champagne Toast 3+/5    Special Tests:  Negative lag, No Tomi muscle deformity. Neurovascular: Sensation to light touch is intact, no motor deficits, palpable radial pulses 2+      Radiology:     No new XR obtained at this time. Assessment :  Mr. Nimo Laird is a pleasant, 76 y.o. patient who underwent a right revision rotator cuff repair with collagen patch augmentation on 8/12/2022. Impression:  Encounter Diagnosis   Name Primary? S/P rotator cuff repair Yes       Office Procedures:  No orders of the defined types were placed in this encounter. Treatment Plan: We assured this patient he will continue to regain strength even up to 1+ year out from surgery. We recommend this patient transition to a home-based program. Today we feel confident releasing this patient from our care. He will continue activities as tolerated with no real restrictions imposed. Self assessment questionnaires were send via E-mail today to be completed. We will see Kaz Room back as needed. All questions were answered to patient's satisfaction and He was encouraged to call with any further questions or concerns. Nimo Laird is in agreement with this plan. Greater than 20 minutes were expended on all aspects of today's visit. 2/9/2023  10:01 AM    Faith Weinberg ATC  [facility] [address] [facility]    During this examination, I, Maryann Ziggyfelipe, functioned as a scribe for Dr. Janice Teran. The history taking and physical examination were performed by Dr. Jacqueline Durand. All counseling during the appointment was performed between the patient and Dr. Jacqueline Durand.  2/9/23  ______________  I, Dr. Janice Teran, personally performed the services described in this documentation as described by Faith Weinberg ATC in my presence, and it is both accurate and complete. Hemant Stanley MD, PhD  2/9/2023

## (undated) DEVICE — SUTURE MCRYL SZ 4-0 L27IN ABSRB UD L19MM PS-2 1/2 CIR PRIM Y426H

## (undated) DEVICE — GOWN,SIRUS,POLYRNF,BRTHSLV,XL,30/CS: Brand: MEDLINE

## (undated) DEVICE — DRILL ENDOSCP D6MM 45DEG S STL OSSEOUS TISS POWERPICK

## (undated) DEVICE — SOLUTION IV IRRIG LACTATED RINGERS 3000ML 2B7487

## (undated) DEVICE — UNDERGLOVE SURG SZ 8 BLU LTX FREE SYN POLYISOPRENE POLYMER

## (undated) DEVICE — BLADE SHV L13CM DIA5MM EXCALIBUR AGG COOLCUT

## (undated) DEVICE — TOWEL,STOP FLAG GOLD N-W: Brand: MEDLINE

## (undated) DEVICE — NEEDLE SUT PASS FOR ROT CUF LABRAL REP MULTFI SCORPION

## (undated) DEVICE — PAD DRY FLOOR ABS 32X58IN GRN

## (undated) DEVICE — PUDDLEVAC FLOOR SUCTION DEVICE: Brand: PUDDLEVAC

## (undated) DEVICE — SUTURE VCRL SZ 3-0 L27IN ABSRB UD L26MM CT-2 1/2 CIR J232H

## (undated) DEVICE — SHOULDER ARTHROSCOPY: Brand: MEDLINE INDUSTRIES, INC.

## (undated) DEVICE — TUBING PMP L16FT MAIN DISP FOR AR-6400 AR-6475

## (undated) DEVICE — GOWN,REINFRCE,POLY,ECLIPSE,SLV,3XLG: Brand: MEDLINE

## (undated) DEVICE — GLOVE SURG SZ 8 L12IN FNGR THK75MIL WHT LTX POLYMER BEAD

## (undated) DEVICE — SPONGE GZ W4XL8IN COT WVN 12 PLY

## (undated) DEVICE — SYSTEM SKIN CLSR 22CM DERMBND PRINEO

## (undated) DEVICE — SUTURE FIBERWIRE SZ 2 W/ TAPERED NEEDLE BLUE L38IN NONABSORB BLU L26.5MM 1/2 CIRCLE AR7200

## (undated) DEVICE — SUTURE PDS II SZ 1 L27IN ABSRB VLT CT-1 L36MM 1/2 CIR Z341H

## (undated) DEVICE — CANNULA ARTHSCP L7CM DIA7MM TRNSLUC THRD FLX W/ NO SQUIRT

## (undated) DEVICE — PROBE ABLAT XL 90DEG ASPIR BPLR RF 1 PC ELECTRD ERGO HNDL

## (undated) DEVICE — TUBING PMP L6FT CONT WAVE EXTN

## (undated) DEVICE — CANNULA ARTHSCP L3CM ID8MM DBL DAM 1 PC MOLD LO PROF FLNG

## (undated) DEVICE — TUBING FLD MGMT Y DBL SPIK DUALWAVE

## (undated) DEVICE — 3M™ IOBAN™ 2 ANTIMICROBIAL INCISE DRAPE 6640EZ: Brand: IOBAN™ 2

## (undated) DEVICE — BUR SHAVER 5 MMX13 CM 8 FLUT OVL FOR AGGRESSIVE BNE COOLCUT